# Patient Record
Sex: MALE | Race: WHITE | HISPANIC OR LATINO | URBAN - METROPOLITAN AREA
[De-identification: names, ages, dates, MRNs, and addresses within clinical notes are randomized per-mention and may not be internally consistent; named-entity substitution may affect disease eponyms.]

---

## 2020-12-03 ENCOUNTER — EMERGENCY (EMERGENCY)
Facility: HOSPITAL | Age: 65
LOS: 0 days | Discharge: HOME | End: 2020-12-03
Attending: EMERGENCY MEDICINE
Payer: OTHER MISCELLANEOUS

## 2020-12-03 VITALS
RESPIRATION RATE: 18 BRPM | WEIGHT: 225.09 LBS | TEMPERATURE: 98 F | DIASTOLIC BLOOD PRESSURE: 64 MMHG | OXYGEN SATURATION: 100 % | HEIGHT: 69 IN | HEART RATE: 62 BPM | SYSTOLIC BLOOD PRESSURE: 114 MMHG
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
## 2020-12-03 DIAGNOSIS — S82.831A OTHER FRACTURE OF UPPER AND LOWER END OF RIGHT FIBULA, INITIAL ENCOUNTER FOR CLOSED FRACTURE: ICD-10-CM

## 2020-12-03 DIAGNOSIS — M25.561 PAIN IN RIGHT KNEE: ICD-10-CM
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
## 2020-12-03 DIAGNOSIS — E11.9 TYPE 2 DIABETES MELLITUS WITHOUT COMPLICATIONS: ICD-10-CM

## 2020-12-03 DIAGNOSIS — S76.191A OTHER SPECIFIED INJURY OF RIGHT QUADRICEPS MUSCLE, FASCIA AND TENDON, INITIAL ENCOUNTER: ICD-10-CM
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
## 2020-12-03 PROCEDURE — 73562 X-RAY EXAM OF KNEE 3: CPT | Mod: RT

## 2020-12-03 PROCEDURE — 73600 X-RAY EXAM OF ANKLE: CPT | Mod: RT

## 2020-12-03 PROCEDURE — 73590 X-RAY EXAM OF LOWER LEG: CPT | Mod: RT

## 2020-12-03 PROCEDURE — 73590 X-RAY EXAM OF LOWER LEG: CPT | Mod: 26,RT

## 2020-12-03 PROCEDURE — 73562 X-RAY EXAM OF KNEE 3: CPT | Mod: 26,RT

## 2020-12-03 PROCEDURE — 73600 X-RAY EXAM OF ANKLE: CPT | Mod: 26,RT

## 2020-12-03 PROCEDURE — 99284 EMERGENCY DEPT VISIT MOD MDM: CPT | Mod: 25

## 2020-12-03 PROCEDURE — 99283 EMERGENCY DEPT VISIT LOW MDM: CPT

## 2020-12-03 NOTE — ED ADULT TRIAGE NOTE - CHIEF COMPLAINT QUOTE
c/o fall at work, pt states he felt "pop" in right knee while carrying tools back to truck, c/o right knee swelling and pain

## 2020-12-03 NOTE — ED STATDOCS - NSFOLLOWUPINSTRUCTIONS_ED_ALL_ED_FT
Contusion in Adults    WHAT YOU NEED TO KNOW:    A contusion is a bruise that appears on your skin after an injury. A bruise happens when small blood vessels tear but skin does not. When blood vessels tear, blood leaks into nearby tissue, such as soft tissue or muscle.    DISCHARGE INSTRUCTIONS:    Return to the emergency department if:     You have new trouble moving the injured area.      You have tingling or numbness in or near the injured area.      Your hand or foot below the bruise gets cold or turns pale.     Contact your healthcare provider if:     You find a new lump in the injured area.      Your symptoms do not improve with treatment after 4 to 5 days.      You have questions or concerns about your condition or care.    Medicines: You may need any of the following:     NSAIDs help decrease swelling and pain or fever. This medicine is available with or without a doctor's order. NSAIDs can cause stomach bleeding or kidney problems in certain people. If you take blood thinner medicine, always ask your healthcare provider if NSAIDs are safe for you. Always read the medicine label and follow directions.      Prescription pain medicine may be given. Do not wait until the pain is severe before you take your medicine.      Take your medicine as directed. Contact your healthcare provider if you think your medicine is not helping or if you have side effects. Tell him of her if you are allergic to any medicine. Keep a list of the medicines, vitamins, and herbs you take. Include the amounts, and when and why you take them. Bring the list or the pill bottles to follow-up visits. Carry your medicine list with you in case of an emergency.    Follow up with your healthcare provider as directed: You may need to return within a week to check your injury again. Write down your questions so you remember to ask them during your visits.    Help a contusion heal:     Rest the injured area or use it less than usual. If you bruised your leg or foot, you may need crutches or a cane to help you walk. This will help you keep weight off your injured body part.       Apply ice to decrease swelling and pain. Ice may also help prevent tissue damage. Use an ice pack, or put crushed ice in a plastic bag. Cover it with a towel and place it on your bruise for 15 to 20 minutes every hour or as directed.      Use compression to support the area and decrease swelling. Wrap an elastic bandage around the area over the bruised muscle. Make sure the bandage is not too tight. You should be able to fit 1 finger between the bandage and your skin.      Elevate (raise) your injured body part above the level of your heart to help decrease pain and swelling. Use pillows, blankets, or rolled towels to elevate the area as often as you can.      Do not drink alcohol as directed. Alcohol may slow healing.      Do not stretch injured muscles right after your injury. Ask your healthcare provider when and how you may safely stretch after your injury. Gentle stretches can help increase your flexibility.      Do not massage the area or put heating pads on the bruise right after your injury. Heat and massage may slow healing. Your healthcare provider may tell you to apply heat after several days. At that time, heat will start to help the injury heal.    Prevent another contusion:     Stretch and warm up before you play sports or exercise.      Wear protective gear when you play sports. Examples are shin guards and padding.       If you begin a new physical activity, start slowly to give your body a chance to adjust. Follow up with the orthopedist tomorrow or friday for an elective surgery.     Weight bearing as tolerated.  Do not remove the immobilizer and walk with crutches.     Return to the ER for any new or other concerns.         Quadriceps Tendon Tear       A quadriceps tendon tear or disruption is a partial or complete tear of the tendon between the quadriceps muscles and the kneecap (patella). Tendons connect muscles to bone. The quadriceps muscles are located on the front of the thigh and are primarily used in straightening the knee.    With a partial tear, the tendon is overstretched and some of the fibers are frayed. With a complete tear, the quadriceps muscle is detached from the kneecap. This is very rare.      What are the causes?  This condition can be caused by injury such as:  •A deep cut on your thigh that injures the tendon.      •Falling on your knee, which may result in breaking your patella.      The condition can also occur if you jump and land flat on your foot with your knee bent, causing a quick and forceful tightening (contraction) of your quadriceps.      What increases the risk?  The following factors may make you more likely to develop this condition:•Participating in:  •Activities that involve jumping, such as basketball.      •Activities in which your knee muscles contract suddenly and forcefully, such as doing jumps or moguls in downhill skiing.      •Having a weakened tendon from:  •Long-term (chronic) quadriceps tendinitis.      •Long periods of not moving your knee (immobilization).      •Repeated steroid injections into the quadriceps tendon.      •Medical conditions such as diabetes, lupus, or rheumatoid arthritis.      •Degeneration over time. Most quadriceps tendon tears occur in males over 40 years of age.          What are the signs or symptoms?  Symptoms of this condition include:  •Hearing a popping sound or feeling a tear above your patella at the time of injury.      •Pain and tenderness over your thigh. The pain may get worse when you use the quadriceps muscles.      •Bruising.      •Difficulty walking, or a feeling that your knee may buckle.      •A sagging kneecap or an indentation above your kneecap.      •Not being able to straighten your knee.        How is this diagnosed?  This condition may be diagnosed based on:  •Your symptoms and medical history.    •A physical exam. During the exam, your health care provider will:  •Feel the area above your kneecap.      •Test the motion and strength of your knee.      •Imaging tests to rule out other conditions and to confirm the diagnosis. These may include:  •X-rays to check for a bone injury, such as a fracture.      •An ultrasound or an MRI to look at the muscles and tendons around your knee.          How is this treated?  This condition may be treated with:  •Medicines to help reduce pain and inflammation.      •RICE therapy. This includes resting, icing, applying pressure (compression), and raising (elevating) the injured area.      •A knee brace (immobilizer) to keep the knee straight while the tendon heals. Typically, the brace will be worn for about 6 weeks.      •Crutches to keep weight off of your injured leg.      •Physical therapy to improve strength and flexibility.      If the injury involves a complete tear of the tendon, surgery is usually needed.      Follow these instructions at home:      RICE therapy      •Rest the injured leg.    •If directed, put ice on the injured area.  •If you have a removable knee brace, remove it as told by your health care provider.      •Put ice in a plastic bag.      •Place a towel between your skin and the bag.      •Leave the ice on for 20 minutes, 2–3 times a day.        •Apply a compression bandage to the area as told by your health care provider.      •Elevate the injured area above the level of your heart while you are sitting or lying down.      If you have a knee brace:     •Wear the brace as told by your health care provider. Remove it only as told by your health care provider.      •Loosen the brace if your toes tingle, become numb, or turn cold and blue.      •Keep the brace clean.    •If the brace is not waterproof:  •Do not let it get wet.      •Cover it with a watertight covering when you take a bath or shower.        •Ask your health care provider when it is safe to drive if you have a knee brace.      Activity     • Do not use the injured limb to support your body weight until your health care provider says that you can. Use crutches as told by your health care provider.      •Do exercises as told by your health care provider.      •Return to your normal activities as told by your health care provider. Ask your health care provider what activities are safe for you.      General instructions     •Take over-the-counter and prescription medicines only as told by your health care provider.      • Do not use any products that contain nicotine or tobacco, such as cigarettes, e-cigarettes, and chewing tobacco. These can delay healing. If you need help quitting, ask your health care provider.      •Keep all follow-up visits as told by your health care provider. This is important.        How is this prevented?    •Warm up and stretch before being active.      •Cool down and stretch after being active.      •Give your body time to rest between periods of activity.    •Maintain physical fitness, including:  •Strength.      •Flexibility.        •Be safe and responsible while being active. This will help you avoid falls.        Contact a health care provider if:    •Your pain and swelling continue or get worse, even with treatment and rest.      •You are unable to walk or stand without your knee feeling like it will buckle.        Get help right away if:    •You are unable to straighten your knee from a bent position.        Summary    •A quadriceps tendon tear or disruption is a partial or complete tear of the tendon between the quadriceps muscles and the kneecap.      •This condition is caused by injury to the area.      •This condition is treated with RICE therapy, physical therapy, medicines, and surgery if the tendon is completely torn.      • Do not use the injured limb to support your body weight until your health care provider says that you can. Use crutches as told by your health care provider.      •Return to your normal activities as told by your health care provider. Ask your health care provider what activities are safe for you.      This information is not intended to replace advice given to you by your health care provider. Make sure you discuss any questions you have with your health care provider.

## 2020-12-03 NOTE — ED ADULT NURSE NOTE - NSIMPLEMENTINTERV_GEN_ALL_ED
Implemented All Fall Risk Interventions:  [place] to call system. Call bell, personal items and telephone within reach. Instruct patient to call for assistance. Room bathroom lighting operational. Non-slip footwear when patient is off stretcher. Physically safe environment: no spills, clutter or unnecessary equipment. Stretcher in lowest position, wheels locked, appropriate side rails in place. Provide visual cue, wrist band, yellow gown, etc. Monitor gait and stability. Monitor for mental status changes and reorient to person, place, and time. Review medications for side effects contributing to fall risk. Reinforce activity limits and safety measures with patient and family.

## 2020-12-03 NOTE — ED STATDOCS - OBJECTIVE STATEMENT
64 y/o M with PMHx of DM presents ambulatory to the ED s/p fall at work. Reports he was walking and suddenly felt a "pop" in R knee causing him to fall to the ground. Now with +R knee pain and +edema. No fever. NKDA.

## 2020-12-03 NOTE — ED STATDOCS - CARE PROVIDER_API CALL
Kushal Guillen (DO)  Orthopaedic Surgery  [address] [place] [address]  Phone: [phone]  Fax: [phone]  Follow Up Time:

## 2020-12-03 NOTE — ED STATDOCS - PATIENT PORTAL LINK FT
You can access the FollowMyHealth Patient Portal offered by [facility] by registering at the following website: [email or web address]. By joining [employer]’s FollowMyHealth portal, you will also be able to view your health information using other applications (apps) compatible with our system. You can access the FollowMyHealth Patient Portal offered by [facility] by registering at the following website: [email or web address]. By joining [employer]’s FollowMyHealth portal, you will also be able to view your health information using other applications (apps) compatible with our system.

## 2020-12-03 NOTE — ED ADULT NURSE NOTE - OBJECTIVE STATEMENT
Patient presents ambulatory to the ED s/p fall at work. Reports he was walking and suddenly felt a "pop" in R knee causing him to fall to the ground. Now with +R knee pain and +edema.

## 2020-12-03 NOTE — ED STATDOCS - PROGRESS NOTE DETAILS
64 yo male with a PMH of NIDDM presents with R knee pain s/p fall. Pt was walking at work when he felt a sharp pain to the R knee which caused his knee to buckle and fall. Pain located above the patella and worse with bending. Better with keeping the leg extended. XR and reeval. Pt was offered pain meds but refused. -Brad Jimenez PA-C XR unremarkable. Will send ortho referral for the pt and placed in knee immobilizer. XR unremarkable. Will send ortho referral for the pt and placed in knee immobilizer. -Brad Jimenez PA-C Pt with fibular fx. Will need splinted, orthopedics. Justus Dumont D.O. XR unremarkable. Will send ortho referral for the pt and placed in knee immobilizer. -Brad Jimenez PA-C    8116: XR with confirmed fibular head fracture. Spoke with ortho resident willcome to see pt. Additional xrs ordered. -Brad Jimenez PA-C Spoke with ortho. States pt with likely quadriceps rupture. Placed in bulky cervantes and knee immobilizer and to f/u with Dr. Guillen tomorrow or friday for eleactive surgery. -Brad Jimenez PA-C

## 2020-12-03 NOTE — PROGRESS NOTE ADULT - ASSESSMENT
Assessment/Plan:  65y Male with Right quadriceps tendon avulsion and stable right proximal fibular fibula fracture     Proximal fibula fracture  -No medial clear space widening on stress view of ankle indicates stable proximal fibular fracture  -Patient may WBAT   -Pain control as needed    Quadriceps tendon rupture  -WBAT in Bulky cervantes knee immobilizer with crutches  -Pain control as needed  -Follow up with Dr. Guillen tomorrow or monday  -Will require surgical planning for quad tendon repair   -Discussed with Dr. Guillen  -No acute orthopedic surgical intervention needed at this time  -Ortho stable for discharge

## 2020-12-03 NOTE — PROGRESS NOTE ADULT - SUBJECTIVE AND OBJECTIVE BOX
65y Male presents s/p feeling a pop in his right quad / falling backward. Patient reports he was walking at work this afternoon when he felt a pop in his right quad leading him to fall. He reports pain proximal to superior pole of patella, pain over fibular head and at ankle. He denies numbness/tingling in the affected extremity. Denies head strike/LOC/other orthopedic injuries at this time. Patient ambulates without assistance at baseline.    PAST MEDICAL & SURGICAL HISTORY:    Home Medications:    Allergies    No Known Allergies    Intolerances      Vital Signs Last 24 Hrs  T(C): 36.8 (03 Dec 2020 15:56), Max: 36.8 (03 Dec 2020 15:56)  T(F): 98.3 (03 Dec 2020 15:56), Max: 98.3 (03 Dec 2020 15:56)  HR: 62 (03 Dec 2020 15:56) (62 - 62)  BP: 114/64 (03 Dec 2020 15:56) (114/64 - 114/64)  BP(mean): --  RR: 18 (03 Dec 2020 15:56) (18 - 18)  SpO2: 100% (03 Dec 2020 15:56) (100% - 100%)    PHYSICAL EXAM  General: NAD, Awake and Alert  RLE:  No gross deformity noted, however significant palpable defect felt over the quad tendon insertion  TTP over proximal pole of the patella  TTP over fibular head proximally  TTP over ankle medially  TTP with compression of tibia/fibula  Unable to straight leg raise  +sensation L2-S1  +dorsiflexion/plantarflexion of ankle/hallux  +dorsalis pedis pulse  Soft compartments, - calf tenderness      Secondary Exam: Benign, Skin intact, NTTP along axial spine, SILT throughout, motor grossly intact throughout, no other orthopedic injuries at this time, compartments soft and compressible        IMAGING:  XR : Right knee depicts patella baja and proximal fibula fracture - oblique orientation, non displaced around the fibular neck.    XR R Ankle depicts arthritis but no Fx/Dx. On stress view, medial clear space does not increase

## 2020-12-03 NOTE — ED STATDOCS - CLINICAL SUMMARY MEDICAL DECISION MAKING FREE TEXT BOX
64 yo male presents with R knee injury. Xr unremarkable. WIll place 9in immobilizer and give ortho f/u. -Brad Jimenez PA-C Pt with fibular fx, quad tendon tear.  Evaluated by orthopedics, immobilized, f/u with orthopedics as outpatient. Justus Dumont D.O.

## 2020-12-15 PROBLEM — E11.9 TYPE 2 DIABETES MELLITUS WITHOUT COMPLICATIONS: Chronic | Status: ACTIVE | Noted: 2020-12-03

## 2020-12-16 ENCOUNTER — OUTPATIENT (OUTPATIENT)
Dept: OUTPATIENT SERVICES | Facility: HOSPITAL | Age: 65
LOS: 1 days | End: 2020-12-16
Payer: OTHER MISCELLANEOUS

## 2020-12-16 VITALS
HEART RATE: 61 BPM | WEIGHT: 225.09 LBS | HEIGHT: 69 IN | SYSTOLIC BLOOD PRESSURE: 123 MMHG | OXYGEN SATURATION: 99 % | DIASTOLIC BLOOD PRESSURE: 57 MMHG | TEMPERATURE: 98 F | RESPIRATION RATE: 18 BRPM

## 2020-12-16 DIAGNOSIS — Z90.49 ACQUIRED ABSENCE OF OTHER SPECIFIED PARTS OF DIGESTIVE TRACT: Chronic | ICD-10-CM

## 2020-12-16 DIAGNOSIS — Z01.818 ENCOUNTER FOR OTHER PREPROCEDURAL EXAMINATION: ICD-10-CM

## 2020-12-16 DIAGNOSIS — S76.111D STRAIN OF RIGHT QUADRICEPS MUSCLE, FASCIA AND TENDON, SUBSEQUENT ENCOUNTER: ICD-10-CM

## 2020-12-16 DIAGNOSIS — Z98.890 OTHER SPECIFIED POSTPROCEDURAL STATES: Chronic | ICD-10-CM

## 2020-12-16 LAB
A1C WITH ESTIMATED AVERAGE GLUCOSE RESULT: 8 % — HIGH (ref 4–5.6)
ANION GAP SERPL CALC-SCNC: 4 MMOL/L — LOW (ref 5–17)
APPEARANCE UR: CLEAR — SIGNIFICANT CHANGE UP
APTT BLD: 30.3 SEC — SIGNIFICANT CHANGE UP (ref 27.5–35.5)
BASOPHILS # BLD AUTO: 0.12 K/UL — SIGNIFICANT CHANGE UP (ref 0–0.2)
BASOPHILS NFR BLD AUTO: 1.5 % — SIGNIFICANT CHANGE UP (ref 0–2)
BILIRUB UR-MCNC: NEGATIVE — SIGNIFICANT CHANGE UP
BUN SERPL-MCNC: 20 MG/DL — SIGNIFICANT CHANGE UP (ref 7–23)
CALCIUM SERPL-MCNC: 9.4 MG/DL — SIGNIFICANT CHANGE UP (ref 8.5–10.1)
CHLORIDE SERPL-SCNC: 105 MMOL/L — SIGNIFICANT CHANGE UP (ref 96–108)
CO2 SERPL-SCNC: 30 MMOL/L — SIGNIFICANT CHANGE UP (ref 22–31)
COLOR SPEC: YELLOW — SIGNIFICANT CHANGE UP
CREAT SERPL-MCNC: 1.17 MG/DL — SIGNIFICANT CHANGE UP (ref 0.5–1.3)
DIFF PNL FLD: NEGATIVE — SIGNIFICANT CHANGE UP
EOSINOPHIL # BLD AUTO: 0.46 K/UL — SIGNIFICANT CHANGE UP (ref 0–0.5)
EOSINOPHIL NFR BLD AUTO: 5.6 % — SIGNIFICANT CHANGE UP (ref 0–6)
ESTIMATED AVERAGE GLUCOSE: 183 MG/DL — HIGH (ref 68–114)
GLUCOSE SERPL-MCNC: 81 MG/DL — SIGNIFICANT CHANGE UP (ref 70–99)
GLUCOSE UR QL: NEGATIVE MG/DL — SIGNIFICANT CHANGE UP
HCT VFR BLD CALC: 46.4 % — SIGNIFICANT CHANGE UP (ref 39–50)
HGB BLD-MCNC: 14.9 G/DL — SIGNIFICANT CHANGE UP (ref 13–17)
IMM GRANULOCYTES NFR BLD AUTO: 0.4 % — SIGNIFICANT CHANGE UP (ref 0–1.5)
INR BLD: 1.13 RATIO — SIGNIFICANT CHANGE UP (ref 0.88–1.16)
KETONES UR-MCNC: ABNORMAL
LEUKOCYTE ESTERASE UR-ACNC: ABNORMAL
LYMPHOCYTES # BLD AUTO: 1.97 K/UL — SIGNIFICANT CHANGE UP (ref 1–3.3)
LYMPHOCYTES # BLD AUTO: 24 % — SIGNIFICANT CHANGE UP (ref 13–44)
MCHC RBC-ENTMCNC: 32.1 GM/DL — SIGNIFICANT CHANGE UP (ref 32–36)
MCHC RBC-ENTMCNC: 32.1 PG — SIGNIFICANT CHANGE UP (ref 27–34)
MCV RBC AUTO: 100 FL — SIGNIFICANT CHANGE UP (ref 80–100)
MONOCYTES # BLD AUTO: 0.57 K/UL — SIGNIFICANT CHANGE UP (ref 0–0.9)
MONOCYTES NFR BLD AUTO: 6.9 % — SIGNIFICANT CHANGE UP (ref 2–14)
NEUTROPHILS # BLD AUTO: 5.06 K/UL — SIGNIFICANT CHANGE UP (ref 1.8–7.4)
NEUTROPHILS NFR BLD AUTO: 61.6 % — SIGNIFICANT CHANGE UP (ref 43–77)
NITRITE UR-MCNC: NEGATIVE — SIGNIFICANT CHANGE UP
PH UR: 5 — SIGNIFICANT CHANGE UP (ref 5–8)
PLATELET # BLD AUTO: 389 K/UL — SIGNIFICANT CHANGE UP (ref 150–400)
POTASSIUM SERPL-MCNC: 4.6 MMOL/L — SIGNIFICANT CHANGE UP (ref 3.5–5.3)
POTASSIUM SERPL-SCNC: 4.6 MMOL/L — SIGNIFICANT CHANGE UP (ref 3.5–5.3)
PROT UR-MCNC: 15 MG/DL
PROTHROM AB SERPL-ACNC: 13.1 SEC — SIGNIFICANT CHANGE UP (ref 10.6–13.6)
RBC # BLD: 4.64 M/UL — SIGNIFICANT CHANGE UP (ref 4.2–5.8)
RBC # FLD: 13.4 % — SIGNIFICANT CHANGE UP (ref 10.3–14.5)
SODIUM SERPL-SCNC: 139 MMOL/L — SIGNIFICANT CHANGE UP (ref 135–145)
SP GR SPEC: 1.02 — SIGNIFICANT CHANGE UP (ref 1.01–1.02)
UROBILINOGEN FLD QL: NEGATIVE MG/DL — SIGNIFICANT CHANGE UP
WBC # BLD: 8.21 K/UL — SIGNIFICANT CHANGE UP (ref 3.8–10.5)
WBC # FLD AUTO: 8.21 K/UL — SIGNIFICANT CHANGE UP (ref 3.8–10.5)

## 2020-12-16 PROCEDURE — 86850 RBC ANTIBODY SCREEN: CPT

## 2020-12-16 PROCEDURE — 86901 BLOOD TYPING SEROLOGIC RH(D): CPT

## 2020-12-16 PROCEDURE — 85730 THROMBOPLASTIN TIME PARTIAL: CPT

## 2020-12-16 PROCEDURE — 81001 URINALYSIS AUTO W/SCOPE: CPT

## 2020-12-16 PROCEDURE — 83036 HEMOGLOBIN GLYCOSYLATED A1C: CPT

## 2020-12-16 PROCEDURE — 93005 ELECTROCARDIOGRAM TRACING: CPT

## 2020-12-16 PROCEDURE — 36415 COLL VENOUS BLD VENIPUNCTURE: CPT

## 2020-12-16 PROCEDURE — 93010 ELECTROCARDIOGRAM REPORT: CPT

## 2020-12-16 PROCEDURE — 85025 COMPLETE CBC W/AUTO DIFF WBC: CPT

## 2020-12-16 PROCEDURE — 86900 BLOOD TYPING SEROLOGIC ABO: CPT

## 2020-12-16 PROCEDURE — G0463: CPT | Mod: 25

## 2020-12-16 PROCEDURE — 85610 PROTHROMBIN TIME: CPT

## 2020-12-16 PROCEDURE — 80048 BASIC METABOLIC PNL TOTAL CA: CPT

## 2020-12-16 NOTE — H&P PST ADULT - NSANTHOSAYNRD_GEN_A_CORE
No. STEW screening performed.  STOP BANG Legend: 0-2 = LOW Risk; 3-4 = INTERMEDIATE Risk; 5-8 = HIGH Risk

## 2020-12-16 NOTE — H&P PST ADULT - HISTORY OF PRESENT ILLNESS
66 y/o male with right quad tendon rupture. Pt reports he was at work, while walking he heard a pop from his right leg then he fell, he went to ER at , a splint was applied in ER. He is here for PST for planned Right quad tendon repair.

## 2020-12-16 NOTE — H&P PST ADULT - SKIN

## 2020-12-16 NOTE — H&P PST ADULT - ASSESSMENT
66 y/o male with right quad tendon rupture. Pt is scheduled for Right quad tendon repair.   Plan  1. Stop all NSAIDS, herbal supplements and vitamins for 7 days.  2. NPO as per ASU instructions.  3. Take the following medications ( Amlodipine ) with small sips of water on the morning of your procedure/surgery.  4. Use EZ sponges as directed  5. Labs, EKG as per surgeon. COVID test on 12/18/2020, pt instructed.   6. PMD visit for optimization prior to surgery as per surgeon  7. Advised to quarantine prior to surgery  8. Hold Metformin 24 hrs before surgery, pt to follow preop insulin instructions from PMD.

## 2020-12-16 NOTE — H&P PST ADULT - NSICDXPASTMEDICALHX_GEN_ALL_CORE_FT
PAST MEDICAL HISTORY:  DM (diabetes mellitus)     HTN (hypertension)     Hypercholesterolemia     Lumbar spinal stenosis     OA (osteoarthritis)     Rupture quadriceps tendon right

## 2020-12-16 NOTE — H&P PST ADULT - NSICDXPASTSURGICALHX_GEN_ALL_CORE_FT
PAST SURGICAL HISTORY:  S/P Achilles tendon repair     S/P cholecystectomy open    S/P laminectomy lumbar

## 2020-12-16 NOTE — H&P PST ADULT - NSICDXFAMILYHX_GEN_ALL_CORE_FT
FAMILY HISTORY:  Family history of Alzheimer's disease, mother  FH: CHF (congestive heart failure), mother

## 2020-12-17 DIAGNOSIS — S76.111D STRAIN OF RIGHT QUADRICEPS MUSCLE, FASCIA AND TENDON, SUBSEQUENT ENCOUNTER: ICD-10-CM

## 2020-12-17 DIAGNOSIS — Z01.818 ENCOUNTER FOR OTHER PREPROCEDURAL EXAMINATION: ICD-10-CM

## 2020-12-18 ENCOUNTER — OUTPATIENT (OUTPATIENT)
Dept: OUTPATIENT SERVICES | Facility: HOSPITAL | Age: 65
LOS: 1 days | End: 2020-12-18
Payer: OTHER MISCELLANEOUS

## 2020-12-18 DIAGNOSIS — Z98.890 OTHER SPECIFIED POSTPROCEDURAL STATES: Chronic | ICD-10-CM

## 2020-12-18 DIAGNOSIS — Z20.828 CONTACT WITH AND (SUSPECTED) EXPOSURE TO OTHER VIRAL COMMUNICABLE DISEASES: ICD-10-CM

## 2020-12-18 DIAGNOSIS — Z90.49 ACQUIRED ABSENCE OF OTHER SPECIFIED PARTS OF DIGESTIVE TRACT: Chronic | ICD-10-CM

## 2020-12-18 LAB — SARS-COV-2 RNA SPEC QL NAA+PROBE: SIGNIFICANT CHANGE UP

## 2020-12-18 PROCEDURE — U0003: CPT

## 2020-12-19 DIAGNOSIS — Z20.828 CONTACT WITH AND (SUSPECTED) EXPOSURE TO OTHER VIRAL COMMUNICABLE DISEASES: ICD-10-CM

## 2020-12-20 ENCOUNTER — INPATIENT (INPATIENT)
Facility: HOSPITAL | Age: 65
LOS: 1 days | Discharge: HOME HEALTH | DRG: 317 | End: 2020-12-22
Attending: ORTHOPAEDIC SURGERY | Admitting: ORTHOPAEDIC SURGERY
Payer: OTHER MISCELLANEOUS

## 2020-12-20 VITALS — WEIGHT: 229.94 LBS | HEIGHT: 69 IN

## 2020-12-20 DIAGNOSIS — Z98.890 OTHER SPECIFIED POSTPROCEDURAL STATES: Chronic | ICD-10-CM

## 2020-12-20 DIAGNOSIS — S76.119A STRAIN OF UNSPECIFIED QUADRICEPS MUSCLE, FASCIA AND TENDON, INITIAL ENCOUNTER: ICD-10-CM

## 2020-12-20 DIAGNOSIS — Z90.49 ACQUIRED ABSENCE OF OTHER SPECIFIED PARTS OF DIGESTIVE TRACT: Chronic | ICD-10-CM

## 2020-12-20 PROBLEM — I10 ESSENTIAL (PRIMARY) HYPERTENSION: Chronic | Status: ACTIVE | Noted: 2020-12-16

## 2020-12-20 PROBLEM — M48.061 SPINAL STENOSIS, LUMBAR REGION WITHOUT NEUROGENIC CLAUDICATION: Chronic | Status: ACTIVE | Noted: 2020-12-16

## 2020-12-20 PROBLEM — M19.90 UNSPECIFIED OSTEOARTHRITIS, UNSPECIFIED SITE: Chronic | Status: ACTIVE | Noted: 2020-12-16

## 2020-12-20 PROBLEM — E78.00 PURE HYPERCHOLESTEROLEMIA, UNSPECIFIED: Chronic | Status: ACTIVE | Noted: 2020-12-16

## 2020-12-20 LAB
ANION GAP SERPL CALC-SCNC: 4 MMOL/L — LOW (ref 5–17)
APTT BLD: 29.2 SEC — SIGNIFICANT CHANGE UP (ref 27.5–35.5)
BASOPHILS # BLD AUTO: 0.12 K/UL — SIGNIFICANT CHANGE UP (ref 0–0.2)
BASOPHILS NFR BLD AUTO: 1.6 % — SIGNIFICANT CHANGE UP (ref 0–2)
BUN SERPL-MCNC: 20 MG/DL — SIGNIFICANT CHANGE UP (ref 7–23)
CALCIUM SERPL-MCNC: 9.4 MG/DL — SIGNIFICANT CHANGE UP (ref 8.5–10.1)
CHLORIDE SERPL-SCNC: 107 MMOL/L — SIGNIFICANT CHANGE UP (ref 96–108)
CO2 SERPL-SCNC: 28 MMOL/L — SIGNIFICANT CHANGE UP (ref 22–31)
CREAT SERPL-MCNC: 1.38 MG/DL — HIGH (ref 0.5–1.3)
EOSINOPHIL # BLD AUTO: 0.44 K/UL — SIGNIFICANT CHANGE UP (ref 0–0.5)
EOSINOPHIL NFR BLD AUTO: 5.9 % — SIGNIFICANT CHANGE UP (ref 0–6)
GLUCOSE SERPL-MCNC: 176 MG/DL — HIGH (ref 70–99)
HCT VFR BLD CALC: 45 % — SIGNIFICANT CHANGE UP (ref 39–50)
HGB BLD-MCNC: 14.7 G/DL — SIGNIFICANT CHANGE UP (ref 13–17)
IMM GRANULOCYTES NFR BLD AUTO: 0.3 % — SIGNIFICANT CHANGE UP (ref 0–1.5)
INR BLD: 1.12 RATIO — SIGNIFICANT CHANGE UP (ref 0.88–1.16)
LYMPHOCYTES # BLD AUTO: 1.76 K/UL — SIGNIFICANT CHANGE UP (ref 1–3.3)
LYMPHOCYTES # BLD AUTO: 23.5 % — SIGNIFICANT CHANGE UP (ref 13–44)
MCHC RBC-ENTMCNC: 32.6 PG — SIGNIFICANT CHANGE UP (ref 27–34)
MCHC RBC-ENTMCNC: 32.7 GM/DL — SIGNIFICANT CHANGE UP (ref 32–36)
MCV RBC AUTO: 99.8 FL — SIGNIFICANT CHANGE UP (ref 80–100)
MONOCYTES # BLD AUTO: 0.5 K/UL — SIGNIFICANT CHANGE UP (ref 0–0.9)
MONOCYTES NFR BLD AUTO: 6.7 % — SIGNIFICANT CHANGE UP (ref 2–14)
NEUTROPHILS # BLD AUTO: 4.64 K/UL — SIGNIFICANT CHANGE UP (ref 1.8–7.4)
NEUTROPHILS NFR BLD AUTO: 62 % — SIGNIFICANT CHANGE UP (ref 43–77)
PLATELET # BLD AUTO: 389 K/UL — SIGNIFICANT CHANGE UP (ref 150–400)
POTASSIUM SERPL-MCNC: 4.7 MMOL/L — SIGNIFICANT CHANGE UP (ref 3.5–5.3)
POTASSIUM SERPL-SCNC: 4.7 MMOL/L — SIGNIFICANT CHANGE UP (ref 3.5–5.3)
PROTHROM AB SERPL-ACNC: 13 SEC — SIGNIFICANT CHANGE UP (ref 10.6–13.6)
RBC # BLD: 4.51 M/UL — SIGNIFICANT CHANGE UP (ref 4.2–5.8)
RBC # FLD: 13.5 % — SIGNIFICANT CHANGE UP (ref 10.3–14.5)
SARS-COV-2 RNA SPEC QL NAA+PROBE: SIGNIFICANT CHANGE UP
SODIUM SERPL-SCNC: 139 MMOL/L — SIGNIFICANT CHANGE UP (ref 135–145)
WBC # BLD: 7.48 K/UL — SIGNIFICANT CHANGE UP (ref 3.8–10.5)
WBC # FLD AUTO: 7.48 K/UL — SIGNIFICANT CHANGE UP (ref 3.8–10.5)

## 2020-12-20 PROCEDURE — 85730 THROMBOPLASTIN TIME PARTIAL: CPT

## 2020-12-20 PROCEDURE — 99223 1ST HOSP IP/OBS HIGH 75: CPT

## 2020-12-20 PROCEDURE — 82962 GLUCOSE BLOOD TEST: CPT

## 2020-12-20 PROCEDURE — 71045 X-RAY EXAM CHEST 1 VIEW: CPT | Mod: 26

## 2020-12-20 PROCEDURE — 85027 COMPLETE CBC AUTOMATED: CPT

## 2020-12-20 PROCEDURE — 93005 ELECTROCARDIOGRAM TRACING: CPT

## 2020-12-20 PROCEDURE — 71045 X-RAY EXAM CHEST 1 VIEW: CPT

## 2020-12-20 PROCEDURE — 85610 PROTHROMBIN TIME: CPT

## 2020-12-20 PROCEDURE — 97530 THERAPEUTIC ACTIVITIES: CPT | Mod: GP

## 2020-12-20 PROCEDURE — 80048 BASIC METABOLIC PNL TOTAL CA: CPT

## 2020-12-20 PROCEDURE — 97162 PT EVAL MOD COMPLEX 30 MIN: CPT | Mod: GP

## 2020-12-20 PROCEDURE — 97116 GAIT TRAINING THERAPY: CPT | Mod: GP

## 2020-12-20 PROCEDURE — 86803 HEPATITIS C AB TEST: CPT

## 2020-12-20 PROCEDURE — 36415 COLL VENOUS BLD VENIPUNCTURE: CPT

## 2020-12-20 RX ORDER — SODIUM CHLORIDE 9 MG/ML
1000 INJECTION, SOLUTION INTRAVENOUS
Refills: 0 | Status: DISCONTINUED | OUTPATIENT
Start: 2020-12-20 | End: 2020-12-22

## 2020-12-20 RX ORDER — DEXTROSE 50 % IN WATER 50 %
15 SYRINGE (ML) INTRAVENOUS ONCE
Refills: 0 | Status: DISCONTINUED | OUTPATIENT
Start: 2020-12-20 | End: 2020-12-22

## 2020-12-20 RX ORDER — MAGNESIUM HYDROXIDE 400 MG/1
30 TABLET, CHEWABLE ORAL DAILY
Refills: 0 | Status: DISCONTINUED | OUTPATIENT
Start: 2020-12-20 | End: 2020-12-22

## 2020-12-20 RX ORDER — ASCORBIC ACID 60 MG
500 TABLET,CHEWABLE ORAL
Refills: 0 | Status: DISCONTINUED | OUTPATIENT
Start: 2020-12-20 | End: 2020-12-22

## 2020-12-20 RX ORDER — SODIUM CHLORIDE 9 MG/ML
1000 INJECTION INTRAMUSCULAR; INTRAVENOUS; SUBCUTANEOUS
Refills: 0 | Status: DISCONTINUED | OUTPATIENT
Start: 2020-12-20 | End: 2020-12-20

## 2020-12-20 RX ORDER — OXYCODONE HYDROCHLORIDE 5 MG/1
5 TABLET ORAL EVERY 4 HOURS
Refills: 0 | Status: DISCONTINUED | OUTPATIENT
Start: 2020-12-20 | End: 2020-12-21

## 2020-12-20 RX ORDER — OXYCODONE HYDROCHLORIDE 5 MG/1
10 TABLET ORAL EVERY 4 HOURS
Refills: 0 | Status: DISCONTINUED | OUTPATIENT
Start: 2020-12-20 | End: 2020-12-21

## 2020-12-20 RX ORDER — HYDROMORPHONE HYDROCHLORIDE 2 MG/ML
0.5 INJECTION INTRAMUSCULAR; INTRAVENOUS; SUBCUTANEOUS EVERY 6 HOURS
Refills: 0 | Status: DISCONTINUED | OUTPATIENT
Start: 2020-12-20 | End: 2020-12-21

## 2020-12-20 RX ORDER — FOLIC ACID 0.8 MG
1 TABLET ORAL DAILY
Refills: 0 | Status: DISCONTINUED | OUTPATIENT
Start: 2020-12-20 | End: 2020-12-21

## 2020-12-20 RX ORDER — DEXTROSE 50 % IN WATER 50 %
12.5 SYRINGE (ML) INTRAVENOUS ONCE
Refills: 0 | Status: DISCONTINUED | OUTPATIENT
Start: 2020-12-20 | End: 2020-12-22

## 2020-12-20 RX ORDER — HEPARIN SODIUM 5000 [USP'U]/ML
5000 INJECTION INTRAVENOUS; SUBCUTANEOUS EVERY 8 HOURS
Refills: 0 | Status: COMPLETED | OUTPATIENT
Start: 2020-12-20 | End: 2020-12-20

## 2020-12-20 RX ORDER — AMLODIPINE BESYLATE 2.5 MG/1
5 TABLET ORAL DAILY
Refills: 0 | Status: DISCONTINUED | OUTPATIENT
Start: 2020-12-20 | End: 2020-12-22

## 2020-12-20 RX ORDER — PANTOPRAZOLE SODIUM 20 MG/1
40 TABLET, DELAYED RELEASE ORAL
Refills: 0 | Status: DISCONTINUED | OUTPATIENT
Start: 2020-12-20 | End: 2020-12-21

## 2020-12-20 RX ORDER — DEXTROSE 50 % IN WATER 50 %
25 SYRINGE (ML) INTRAVENOUS ONCE
Refills: 0 | Status: DISCONTINUED | OUTPATIENT
Start: 2020-12-20 | End: 2020-12-22

## 2020-12-20 RX ORDER — ACETAMINOPHEN 500 MG
650 TABLET ORAL EVERY 6 HOURS
Refills: 0 | Status: DISCONTINUED | OUTPATIENT
Start: 2020-12-20 | End: 2020-12-21

## 2020-12-20 RX ORDER — INSULIN LISPRO 100/ML
VIAL (ML) SUBCUTANEOUS
Refills: 0 | Status: DISCONTINUED | OUTPATIENT
Start: 2020-12-21 | End: 2020-12-22

## 2020-12-20 RX ORDER — GLUCAGON INJECTION, SOLUTION 0.5 MG/.1ML
1 INJECTION, SOLUTION SUBCUTANEOUS ONCE
Refills: 0 | Status: DISCONTINUED | OUTPATIENT
Start: 2020-12-20 | End: 2020-12-22

## 2020-12-20 RX ORDER — SODIUM CHLORIDE 9 MG/ML
500 INJECTION INTRAMUSCULAR; INTRAVENOUS; SUBCUTANEOUS ONCE
Refills: 0 | Status: COMPLETED | OUTPATIENT
Start: 2020-12-20 | End: 2020-12-20

## 2020-12-20 RX ADMIN — SODIUM CHLORIDE 75 MILLILITER(S): 9 INJECTION, SOLUTION INTRAVENOUS at 23:28

## 2020-12-20 RX ADMIN — SODIUM CHLORIDE 500 MILLILITER(S): 9 INJECTION INTRAMUSCULAR; INTRAVENOUS; SUBCUTANEOUS at 13:57

## 2020-12-20 RX ADMIN — HEPARIN SODIUM 5000 UNIT(S): 5000 INJECTION INTRAVENOUS; SUBCUTANEOUS at 21:25

## 2020-12-20 NOTE — H&P ADULT - NSHPPHYSICALEXAM_GEN_ALL_CORE
General: NAD, Aox3  RLE:  - skin intact, palpable defect of the quadriceps tendon  - bony tenderness around the proximal fibula, no bony TTP over patella/knee joint  - patient unable to SLR, no extensor mechanism present   - + EHL/TA/FHL/GSC  - SILT  - + pulses   - compartments soft and compressible    Secondary Assessment:  NC/AT, NTTP of clavicles, NTTP of C-,T-,L-Spine, NTTP of Pelvis  UEs: NTTP of Shoulders, Elbows, Wrists, Hands; NT with AROM/PROM of Shoulders, Elbows, Wrists, Hands; AIN/PIN/Med/Uln/Msc/Rad/Ax intact  LEs: Able to L SLR, NT with Log Roll, NT with Heel Strike, NTTP of Hips, Knees, Ankles, Feet; NT with AROM/PROM of Hips, L Knees, Ankles, Feet; Q/H/Gsc/TA/EHL/FHL intact

## 2020-12-20 NOTE — H&P ADULT - HISTORY OF PRESENT ILLNESS
Patient is a 65 year old male with a history of IDDM, HTN who suffered a mechanical fall on 12/3 and suffered a quadriceps tendon rupture. Patient states he was at work when he stepped awkwardly, heard an audible pop, and fell to the ground now with right knee pain and difficulty ambulating. Patient seen in [place] ED following injury and was placed in knee immobilizer, now here for planned surgery tm with Dr. Miller for primary repair. Patient denies head strike/LOC. Patient denies numbness/tingling. Patient denies additional orthopedic injuries. Denies use of blood thinners. Patient is a normal community ambulator at baseline and works as a generator repairman.

## 2020-12-20 NOTE — ED ADULT NURSE NOTE - OBJECTIVE STATEMENT
Pt states he was sent by Dr. Ayala for "medical clearance" prior to right knee surgery (right quadricep) on 12/21/2020.  Pt states that he has completed preoperative testing on 12/18/2020.

## 2020-12-20 NOTE — ED STATDOCS - PROGRESS NOTE DETAILS
65 yr. old male PMH: Type 2 Diabetes, HTN presents to ED with request for medical clearance sent by Dr. Miller for admission for repair of right quadricep tendon Contacted Dr. Miller reports patient sent to ED for admission and medical clearance. Geovani NP 65 yr. old male PMH: Type 2 Diabetes, HTN presents to ED with request for medical clearance sent by Dr. Miller for admission for repair of right quadricep tendon tear. No fever or chills. Seen and examined by attending in Intake. Plan: IV, Labs, pre op orders and admit. Will F/U with results and re evaluate. Geovani NP

## 2020-12-20 NOTE — ED STATDOCS - NS_ ATTENDINGSCRIBEDETAILS _ED_A_ED_FT
I, Edwin Tapia MD,  performed the initial face to face bedside interview with this patient regarding history of present illness, review of symptoms and relevant past medical, social and family history.  I completed an independent physical examination.    The history, relevant review of systems, past medical and surgical history, medical decision making, and physical examination was documented by the scribe in my presence and I attest to the accuracy of the documentation.

## 2020-12-20 NOTE — ED STATDOCS - PMH
DM (diabetes mellitus)    HTN (hypertension)    Hypercholesterolemia    Lumbar spinal stenosis    OA (osteoarthritis)    Rupture quadriceps tendon  right

## 2020-12-20 NOTE — ED ADULT TRIAGE NOTE - CHIEF COMPLAINT QUOTE
Pt states he was sent by Dr. Ayala for "medical clearance" prior to right knee surgery on 12/21/2020.  Pt states that he has completed preoperative testing on 12/18/2020.

## 2020-12-20 NOTE — H&P ADULT - ASSESSMENT
A/P: 65 year old male with right quadriceps tendon rupture  - Plan for primary surgical repair tomorrow 12/21   - NPO after midnight/IVF  - Heparin dosed for this evening, hold all DVT chemoprophylaxis after midnight tonight  - Admission labs noted  - FU preop labs tomorrow morning  - WBAT RLE with knee immobilizer  - Medical care appreciated  - Hold metformin/novolin, hold HCTZ/losartan per medicine   - Glucose control with ISS  - Medically optimized for procedure tomorrow   - Discussed with attending who agrees with plan, will update as needed.

## 2020-12-20 NOTE — ED ADULT NURSE NOTE - NSIMPLEMENTINTERV_GEN_ALL_ED
Implemented All Fall Risk Interventions:  [place] to call system. Call bell, personal items and telephone within reach. Instruct patient to call for assistance. Room bathroom lighting operational. Non-slip footwear when patient is off stretcher. Physically safe environment: no spills, clutter or unnecessary equipment. Stretcher in lowest position, wheels locked, appropriate side rails in place. Provide visual cue, wrist band, yellow gown, etc. Monitor gait and stability. Monitor for mental status changes and reorient to person, place, and time. Review medications for side effects contributing to fall risk. Reinforce activity limits and safety measures with patient and family.

## 2020-12-20 NOTE — CONSULT NOTE ADULT - ASSESSMENT
right quadriceps rupture. plan for repair in OR tomorrow  patient is moderate risk candidate undergoing moderate risk procedure.    no absolute contraindication therefore medically cleared for procedure tomorrow    Stuart score- 0.3 %-Risk of myocardial infarction or cardiac arrest, intraoperatively or up to 30 days post-op  -care as per ortho     HTN  -cont amlodipine,   -give valsartan/HCTZ now, hold tomorrow     IDDM, last a1c-8.0. on novolin 70/30 45 units BID at home  -hold novolin and metformin, will give lantus 10 units tonight,   -npo after mn  -sliding scale and BGM     dvt prophylaixs   -as per surgery                 right quadriceps rupture. plan for repair in OR tomorrow  patient is moderate risk candidate undergoing moderate risk procedure.    no absolute contraindication therefore medically cleared for procedure tomorrow    Stuart score- 0.3 %-Risk of myocardial infarction or cardiac arrest, intraoperatively or up to 30 days post-op  -care as per ortho     HTN  -cont amlodipine,   -hold valsartan/HCTZ in light of mild elevated creatinine normla on 12/16, will give ns bolus     IDDM, last a1c-8.0. on novolin 70/30 45 units BID at home  -hold novolin and metformin, will give lantus 10 units tonight,   -npo after mn  -sliding scale and BGM     dvt prophylaixs   -as per surgery

## 2020-12-20 NOTE — ED STATDOCS - OBJECTIVE STATEMENT
66 y/o M with PMHx of DM, HTN, HLD, OA, lumbar spinal stenosis s/p laminectomy, and s/p cholecystectomy presents to the ED by orthopedist Dr. Russ Miller for admission and medical clearance for surgery tomorrow 2/2 R quadriceps tendon avulsion. No fever or other complaints. NKDA. PCP: Dr. Ksenia Dong.

## 2020-12-21 ENCOUNTER — TRANSCRIPTION ENCOUNTER (OUTPATIENT)
Age: 65
End: 2020-12-21

## 2020-12-21 LAB
ANION GAP SERPL CALC-SCNC: 5 MMOL/L — SIGNIFICANT CHANGE UP (ref 5–17)
ANION GAP SERPL CALC-SCNC: 6 MMOL/L — SIGNIFICANT CHANGE UP (ref 5–17)
APTT BLD: 27.7 SEC — SIGNIFICANT CHANGE UP (ref 27.5–35.5)
BUN SERPL-MCNC: 18 MG/DL — SIGNIFICANT CHANGE UP (ref 7–23)
BUN SERPL-MCNC: 23 MG/DL — SIGNIFICANT CHANGE UP (ref 7–23)
CALCIUM SERPL-MCNC: 8.7 MG/DL — SIGNIFICANT CHANGE UP (ref 8.5–10.1)
CALCIUM SERPL-MCNC: 8.9 MG/DL — SIGNIFICANT CHANGE UP (ref 8.5–10.1)
CHLORIDE SERPL-SCNC: 107 MMOL/L — SIGNIFICANT CHANGE UP (ref 96–108)
CHLORIDE SERPL-SCNC: 109 MMOL/L — HIGH (ref 96–108)
CO2 SERPL-SCNC: 26 MMOL/L — SIGNIFICANT CHANGE UP (ref 22–31)
CO2 SERPL-SCNC: 27 MMOL/L — SIGNIFICANT CHANGE UP (ref 22–31)
CREAT SERPL-MCNC: 1.14 MG/DL — SIGNIFICANT CHANGE UP (ref 0.5–1.3)
CREAT SERPL-MCNC: 1.2 MG/DL — SIGNIFICANT CHANGE UP (ref 0.5–1.3)
GLUCOSE SERPL-MCNC: 148 MG/DL — HIGH (ref 70–99)
GLUCOSE SERPL-MCNC: 158 MG/DL — HIGH (ref 70–99)
HCT VFR BLD CALC: 39.8 % — SIGNIFICANT CHANGE UP (ref 39–50)
HCT VFR BLD CALC: 41.7 % — SIGNIFICANT CHANGE UP (ref 39–50)
HCV AB S/CO SERPL IA: 0.07 S/CO — SIGNIFICANT CHANGE UP (ref 0–0.99)
HCV AB SERPL-IMP: SIGNIFICANT CHANGE UP
HGB BLD-MCNC: 13.1 G/DL — SIGNIFICANT CHANGE UP (ref 13–17)
HGB BLD-MCNC: 13.5 G/DL — SIGNIFICANT CHANGE UP (ref 13–17)
INR BLD: 1.19 RATIO — HIGH (ref 0.88–1.16)
MCHC RBC-ENTMCNC: 32.4 GM/DL — SIGNIFICANT CHANGE UP (ref 32–36)
MCHC RBC-ENTMCNC: 32.5 PG — SIGNIFICANT CHANGE UP (ref 27–34)
MCHC RBC-ENTMCNC: 32.6 PG — SIGNIFICANT CHANGE UP (ref 27–34)
MCHC RBC-ENTMCNC: 32.9 GM/DL — SIGNIFICANT CHANGE UP (ref 32–36)
MCV RBC AUTO: 100.2 FL — HIGH (ref 80–100)
MCV RBC AUTO: 99 FL — SIGNIFICANT CHANGE UP (ref 80–100)
PLATELET # BLD AUTO: 326 K/UL — SIGNIFICANT CHANGE UP (ref 150–400)
PLATELET # BLD AUTO: 359 K/UL — SIGNIFICANT CHANGE UP (ref 150–400)
POTASSIUM SERPL-MCNC: 4.2 MMOL/L — SIGNIFICANT CHANGE UP (ref 3.5–5.3)
POTASSIUM SERPL-MCNC: 4.4 MMOL/L — SIGNIFICANT CHANGE UP (ref 3.5–5.3)
POTASSIUM SERPL-SCNC: 4.2 MMOL/L — SIGNIFICANT CHANGE UP (ref 3.5–5.3)
POTASSIUM SERPL-SCNC: 4.4 MMOL/L — SIGNIFICANT CHANGE UP (ref 3.5–5.3)
PROTHROM AB SERPL-ACNC: 13.7 SEC — HIGH (ref 10.6–13.6)
RBC # BLD: 4.02 M/UL — LOW (ref 4.2–5.8)
RBC # BLD: 4.16 M/UL — LOW (ref 4.2–5.8)
RBC # FLD: 13.4 % — SIGNIFICANT CHANGE UP (ref 10.3–14.5)
RBC # FLD: 13.6 % — SIGNIFICANT CHANGE UP (ref 10.3–14.5)
SARS-COV-2 IGG SERPL QL IA: NEGATIVE — SIGNIFICANT CHANGE UP
SARS-COV-2 IGM SERPL IA-ACNC: 0.06 INDEX — SIGNIFICANT CHANGE UP
SODIUM SERPL-SCNC: 139 MMOL/L — SIGNIFICANT CHANGE UP (ref 135–145)
SODIUM SERPL-SCNC: 141 MMOL/L — SIGNIFICANT CHANGE UP (ref 135–145)
WBC # BLD: 12.68 K/UL — HIGH (ref 3.8–10.5)
WBC # BLD: 6.46 K/UL — SIGNIFICANT CHANGE UP (ref 3.8–10.5)
WBC # FLD AUTO: 12.68 K/UL — HIGH (ref 3.8–10.5)
WBC # FLD AUTO: 6.46 K/UL — SIGNIFICANT CHANGE UP (ref 3.8–10.5)

## 2020-12-21 PROCEDURE — 93010 ELECTROCARDIOGRAM REPORT: CPT

## 2020-12-21 PROCEDURE — 99232 SBSQ HOSP IP/OBS MODERATE 35: CPT

## 2020-12-21 RX ORDER — FENTANYL CITRATE 50 UG/ML
50 INJECTION INTRAVENOUS
Refills: 0 | Status: DISCONTINUED | OUTPATIENT
Start: 2020-12-21 | End: 2020-12-21

## 2020-12-21 RX ORDER — ONDANSETRON 8 MG/1
4 TABLET, FILM COATED ORAL ONCE
Refills: 0 | Status: DISCONTINUED | OUTPATIENT
Start: 2020-12-21 | End: 2020-12-21

## 2020-12-21 RX ORDER — INSULIN LISPRO 100/ML
VIAL (ML) SUBCUTANEOUS
Refills: 0 | Status: DISCONTINUED | OUTPATIENT
Start: 2020-12-21 | End: 2020-12-22

## 2020-12-21 RX ORDER — BENZOCAINE AND MENTHOL 5; 1 G/100ML; G/100ML
1 LIQUID ORAL
Refills: 0 | Status: DISCONTINUED | OUTPATIENT
Start: 2020-12-21 | End: 2020-12-22

## 2020-12-21 RX ORDER — DEXTROSE 50 % IN WATER 50 %
15 SYRINGE (ML) INTRAVENOUS ONCE
Refills: 0 | Status: DISCONTINUED | OUTPATIENT
Start: 2020-12-21 | End: 2020-12-22

## 2020-12-21 RX ORDER — AMLODIPINE BESYLATE 2.5 MG/1
5 TABLET ORAL DAILY
Refills: 0 | Status: DISCONTINUED | OUTPATIENT
Start: 2020-12-21 | End: 2020-12-21

## 2020-12-21 RX ORDER — SODIUM CHLORIDE 9 MG/ML
1000 INJECTION, SOLUTION INTRAVENOUS
Refills: 0 | Status: DISCONTINUED | OUTPATIENT
Start: 2020-12-21 | End: 2020-12-22

## 2020-12-21 RX ORDER — FAMOTIDINE 10 MG/ML
20 INJECTION INTRAVENOUS EVERY 12 HOURS
Refills: 0 | Status: DISCONTINUED | OUTPATIENT
Start: 2020-12-21 | End: 2020-12-22

## 2020-12-21 RX ORDER — HYDROMORPHONE HYDROCHLORIDE 2 MG/ML
0.5 INJECTION INTRAMUSCULAR; INTRAVENOUS; SUBCUTANEOUS EVERY 6 HOURS
Refills: 0 | Status: DISCONTINUED | OUTPATIENT
Start: 2020-12-21 | End: 2020-12-22

## 2020-12-21 RX ORDER — SODIUM CHLORIDE 9 MG/ML
1000 INJECTION INTRAMUSCULAR; INTRAVENOUS; SUBCUTANEOUS
Refills: 0 | Status: DISCONTINUED | OUTPATIENT
Start: 2020-12-21 | End: 2020-12-22

## 2020-12-21 RX ORDER — ACETAMINOPHEN 500 MG
650 TABLET ORAL EVERY 6 HOURS
Refills: 0 | Status: DISCONTINUED | OUTPATIENT
Start: 2020-12-21 | End: 2020-12-22

## 2020-12-21 RX ORDER — DEXTROSE 50 % IN WATER 50 %
12.5 SYRINGE (ML) INTRAVENOUS ONCE
Refills: 0 | Status: DISCONTINUED | OUTPATIENT
Start: 2020-12-21 | End: 2020-12-22

## 2020-12-21 RX ORDER — ASPIRIN/CALCIUM CARB/MAGNESIUM 324 MG
325 TABLET ORAL DAILY
Refills: 0 | Status: DISCONTINUED | OUTPATIENT
Start: 2020-12-22 | End: 2020-12-22

## 2020-12-21 RX ORDER — GLUCAGON INJECTION, SOLUTION 0.5 MG/.1ML
1 INJECTION, SOLUTION SUBCUTANEOUS ONCE
Refills: 0 | Status: DISCONTINUED | OUTPATIENT
Start: 2020-12-21 | End: 2020-12-22

## 2020-12-21 RX ORDER — FOLIC ACID 0.8 MG
1 TABLET ORAL DAILY
Refills: 0 | Status: DISCONTINUED | OUTPATIENT
Start: 2020-12-21 | End: 2020-12-22

## 2020-12-21 RX ORDER — ONDANSETRON 8 MG/1
4 TABLET, FILM COATED ORAL EVERY 6 HOURS
Refills: 0 | Status: DISCONTINUED | OUTPATIENT
Start: 2020-12-21 | End: 2020-12-21

## 2020-12-21 RX ORDER — SODIUM CHLORIDE 9 MG/ML
1000 INJECTION, SOLUTION INTRAVENOUS
Refills: 0 | Status: DISCONTINUED | OUTPATIENT
Start: 2020-12-21 | End: 2020-12-21

## 2020-12-21 RX ORDER — ASCORBIC ACID 60 MG
500 TABLET,CHEWABLE ORAL
Refills: 0 | Status: DISCONTINUED | OUTPATIENT
Start: 2020-12-21 | End: 2020-12-21

## 2020-12-21 RX ORDER — OXYCODONE HYDROCHLORIDE 5 MG/1
10 TABLET ORAL EVERY 4 HOURS
Refills: 0 | Status: DISCONTINUED | OUTPATIENT
Start: 2020-12-21 | End: 2020-12-22

## 2020-12-21 RX ORDER — ASPIRIN/CALCIUM CARB/MAGNESIUM 324 MG
1 TABLET ORAL
Qty: 30 | Refills: 0
Start: 2020-12-21 | End: 2021-01-19

## 2020-12-21 RX ORDER — HYDROMORPHONE HYDROCHLORIDE 2 MG/ML
0.5 INJECTION INTRAMUSCULAR; INTRAVENOUS; SUBCUTANEOUS EVERY 6 HOURS
Refills: 0 | Status: DISCONTINUED | OUTPATIENT
Start: 2020-12-21 | End: 2020-12-21

## 2020-12-21 RX ORDER — DEXTROSE 50 % IN WATER 50 %
25 SYRINGE (ML) INTRAVENOUS ONCE
Refills: 0 | Status: DISCONTINUED | OUTPATIENT
Start: 2020-12-21 | End: 2020-12-22

## 2020-12-21 RX ORDER — OXYCODONE HYDROCHLORIDE 5 MG/1
10 TABLET ORAL ONCE
Refills: 0 | Status: DISCONTINUED | OUTPATIENT
Start: 2020-12-21 | End: 2020-12-21

## 2020-12-21 RX ORDER — CEFAZOLIN SODIUM 1 G
2000 VIAL (EA) INJECTION EVERY 8 HOURS
Refills: 0 | Status: COMPLETED | OUTPATIENT
Start: 2020-12-21 | End: 2020-12-22

## 2020-12-21 RX ORDER — OXYCODONE HYDROCHLORIDE 5 MG/1
5 TABLET ORAL EVERY 4 HOURS
Refills: 0 | Status: DISCONTINUED | OUTPATIENT
Start: 2020-12-21 | End: 2020-12-22

## 2020-12-21 RX ADMIN — AMLODIPINE BESYLATE 5 MILLIGRAM(S): 2.5 TABLET ORAL at 10:39

## 2020-12-21 RX ADMIN — FAMOTIDINE 20 MILLIGRAM(S): 10 INJECTION INTRAVENOUS at 22:40

## 2020-12-21 RX ADMIN — Medication 100 MILLIGRAM(S): at 22:41

## 2020-12-21 RX ADMIN — PANTOPRAZOLE SODIUM 40 MILLIGRAM(S): 20 TABLET, DELAYED RELEASE ORAL at 10:39

## 2020-12-21 RX ADMIN — Medication 500 MILLIGRAM(S): at 22:40

## 2020-12-21 NOTE — DISCHARGE NOTE PROVIDER - NSDCFUADDINST_GEN_ALL_CORE_FT
Discharge Instructions for Repair of Right Quadriceps Tendon    1. Pain Control.  2. Non-Weight Bearing Right Lower Extremity  3. Elevate and ICE the extremity as much as possible  4. Keep dressing clean and dry. Do not get it wet.   5. Continue DVT/PE Prophylaxis as recommended by the Anticoagulation Team. See Med Rec.   6. Out of Bed Daily, try to keep moving.  7. MD to Remove Staples POD14 (1/4/21)   8. Follow up with Orthopedic Surgeon Dr. Miller in 10-14 Days. Call Office For Appointment.   Discharge Instructions for Repair of Right Quadriceps Tendon    1. Pain Control.  2. Non-Weight Bearing Right Lower Extremity in KNEE IMMOBILIZER at all times. NO KNEE BENDING at all.  3. Elevate and ICE the extremity as much as possible  4. Keep dressing clean and dry. Do not get it wet.   5. Continue DVT/PE Prophylaxis as recommended by the Anticoagulation Team. See Med Rec.   6. Out of Bed Daily, try to keep moving.  7. MD to Remove Staples POD14 (1/4/21)   8. Follow up with Orthopedic Surgeon Dr. Miller in 10-14 Days. Call Office For Appointment.

## 2020-12-21 NOTE — DISCHARGE NOTE PROVIDER - HOSPITAL COURSE
Orthopedic Summary  H&P:  Patient is a 65 year old male with a history of IDDM, HTN who suffered a mechanical fall on 12/3 and suffered a quadriceps tendon rupture. Patient states he was at work when he stepped awkwardly, heard an audible pop, and fell to the ground now with right knee pain and difficulty ambulating. Patient seen in [facility] ED following injury and was placed in knee immobilizer, now here for planned surgery tm with Dr. Miller for primary repair. Patient denies head strike/LOC. Patient denies numbness/tingling. Patient denies additional orthopedic injuries. Denies use of blood thinners. Patient is a normal community ambulator at baseline and works as a generator repairman.  PAST MEDICAL & SURGICAL HISTORY:  Lumbar spinal stenosis    OA (osteoarthritis)    Rupture quadriceps tendon  right    Hypercholesterolemia    HTN (hypertension)    DM (diabetes mellitus)    S/P cholecystectomy  open    S/P Achilles tendon repair    S/P laminectomy  lumbar         Now s/p Right Quadriceps Tendon Repair     Hospital Course:     The patient is a 65y Male status post above. Pt sustained injury from a fall on 12/3 and was admitted through the ED for planned surgery on 12/21. Prior to surgery Patient was medically optimized. Prophylactic antibiotics were started within 30 minutes before the procedure and continued for 24 hours.  There were no complications during the procedure.  The patient was transferred to recovery room in stable condition and subsequently to the orthopedic floor.  Patient was placed on anticoagulation for DVT/PE prophylaxis per the Anticoagulation Team. All home medications were continued.  Physical therapy daily and daily labs were followed.  The knee immobilizer was kept clean, dry, intact. The rest of the hospital stay was unremarkable. Pt received PT daily and was Discharged once cleared per PT.  The orthopedic Attending is aware and agrees. See addendum to DC summary per medical team below for any additional info or if any changes.

## 2020-12-21 NOTE — PROGRESS NOTE ADULT - SUBJECTIVE AND OBJECTIVE BOX
pcp- fruth  cardio john roseband    HPI:  64 y/o male with h/o IDDM a1c-8.0, HTN, was sent by orthopedics for medical clearance prior to procedure tomorrow for right quadriceps repair.  no acute complaints. on 12/3 while at work, he heard a pop from his right leg then he fell,   seen by cardio in 7/2020.  workup was normal as per patient.  no sob or joaquin, chest pain.    non-smoker    12/21: seen at bedside, NPO for OR today, denies any Cp or SOB      all ROS negative except where mentioned      Vital Signs Last 24 Hrs  T(C): 36.6 (12-21-20 @ 09:33), Max: 36.7 (12-21-20 @ 00:17)  T(F): 97.9 (12-21-20 @ 09:33), Max: 98 (12-21-20 @ 00:17)  HR: 57 (12-21-20 @ 09:33) (57 - 68)  BP: 130/65 (12-21-20 @ 09:33) (120/63 - 130/65)  BP(mean): --  RR: 18 (12-21-20 @ 09:33) (16 - 18)  SpO2: 97% (12-21-20 @ 09:33) (97% - 99%)          PHYSICAL EXAM:    GENERAL: Comfortable, no acute distress   HEAD:  Normocephalic, atraumatic  EYES: EOMI, PERRLA  HEENT: Moist mucous membranes  NECK: Supple, No JVD  NERVOUS SYSTEM:  Alert & Oriented X3, Motor Strength 5/5 B/L upper and lower extremities  CHEST/LUNG: Clear to auscultation bilaterally  HEART: Regular rate and rhythm  ABDOMEN: Soft, non tender, Nondistended, Bowel sounds present,  GENITOURINARY: Voiding, no palpable bladder  EXTREMITIES:   No clubbing, cyanosis, or edema  MUSCULOSKELTAL- RLE in immobolizer, + muscle tenderness, no joint tenderness  SKIN-no rash    MEDICATIONS  (STANDING):  amLODIPine   Tablet 5 milliGRAM(s) Oral daily  ascorbic acid 500 milliGRAM(s) Oral two times a day  dextrose 40% Gel 15 Gram(s) Oral once  dextrose 5%. 1000 milliLiter(s) (50 mL/Hr) IV Continuous <Continuous>  dextrose 5%. 1000 milliLiter(s) (100 mL/Hr) IV Continuous <Continuous>  dextrose 50% Injectable 25 Gram(s) IV Push once  dextrose 50% Injectable 12.5 Gram(s) IV Push once  dextrose 50% Injectable 25 Gram(s) IV Push once  folic acid 1 milliGRAM(s) Oral daily  glucagon  Injectable 1 milliGRAM(s) IntraMuscular once  insulin lispro (ADMELOG) corrective regimen sliding scale   SubCutaneous three times a day before meals  lactated ringers. 1000 milliLiter(s) (75 mL/Hr) IV Continuous <Continuous>  multivitamin 1 Tablet(s) Oral daily  pantoprazole    Tablet 40 milliGRAM(s) Oral before breakfast    MEDICATIONS  (PRN):  acetaminophen   Tablet .. 650 milliGRAM(s) Oral every 6 hours PRN Temp greater or equal to 38C (100.4F), Mild Pain (1 - 3)  HYDROmorphone  Injectable 0.5 milliGRAM(s) IV Push every 6 hours PRN Severe breakthrough  magnesium hydroxide Suspension 30 milliLiter(s) Oral daily PRN Constipation  oxyCODONE    IR 10 milliGRAM(s) Oral every 4 hours PRN Severe Pain (7 - 10)  oxyCODONE    IR 5 milliGRAM(s) Oral every 4 hours PRN Moderate Pain (4 - 6)                          13.1   6.46  )-----------( 326      ( 21 Dec 2020 05:45 )             39.8       12-21    141  |  109<H>  |  23  ----------------------------<  148<H>  4.2   |  27  |  1.14    Ca    8.9      21 Dec 2020 05:45        PT/INR - ( 21 Dec 2020 05:45 )   PT: 13.7 sec;   INR: 1.19 ratio         PTT - ( 21 Dec 2020 05:45 )  PTT:27.7 sec      IMP: 66 yo male with above pmh a/w:    # right quadriceps rupture.   pain control   IVF's  NPO  plan for repair in OR today  patient is moderate risk candidate undergoing moderate risk procedure.    no absolute contraindication therefore medically cleared for procedure tomorrow    Stuart score- 0.3 %-Risk of myocardial infarction or cardiac arrest, intraoperatively or up to 30 days post-op  -care as per ortho     HTN  -cont amlodipine,   -hold valsartan/HCTZ in light of mild elevated creatinine yesterday, now resolved with fluids     IDDM,   last a1c-8.0. on novolin 70/30 45 units BID at home  -npo after mn  -sliding scale and BGM     dvt prophylaixs   hold chemical prophylaxis for now  resume post op  venodynes LLE for now     pcp- fruth  cardio john roseband    HPI:  66 y/o male with h/o IDDM a1c-8.0, HTN, was sent by orthopedics for medical clearance prior to procedure tomorrow for right quadriceps repair.  no acute complaints. on 12/3 while at work, he heard a pop from his right leg then he fell,   seen by cardio in 7/2020.  workup was normal as per patient.  no sob or joaquin, chest pain.    non-smoker    12/21: seen at bedside, NPO for OR today, denies any Cp or SOB      all ROS negative except where mentioned      Vital Signs Last 24 Hrs  T(C): 36.6 (12-21-20 @ 09:33), Max: 36.7 (12-21-20 @ 00:17)  T(F): 97.9 (12-21-20 @ 09:33), Max: 98 (12-21-20 @ 00:17)  HR: 57 (12-21-20 @ 09:33) (57 - 68)  BP: 130/65 (12-21-20 @ 09:33) (120/63 - 130/65)  BP(mean): --  RR: 18 (12-21-20 @ 09:33) (16 - 18)  SpO2: 97% (12-21-20 @ 09:33) (97% - 99%)          PHYSICAL EXAM:    GENERAL: Comfortable, no acute distress   HEAD:  Normocephalic, atraumatic  EYES: EOMI, PERRLA  HEENT: Moist mucous membranes  NECK: Supple, No JVD  NERVOUS SYSTEM:  Alert & Oriented X3, Motor Strength 5/5 B/L upper and lower extremities  CHEST/LUNG: Clear to auscultation bilaterally  HEART: Regular rate and rhythm  ABDOMEN: Soft, non tender, Nondistended, Bowel sounds present,  GENITOURINARY: Voiding, no palpable bladder  EXTREMITIES:   No clubbing, cyanosis, or edema  MUSCULOSKELTAL- RLE in immobolizer, + muscle tenderness, no joint tenderness  SKIN-no rash    MEDICATIONS  (STANDING):  amLODIPine   Tablet 5 milliGRAM(s) Oral daily  ascorbic acid 500 milliGRAM(s) Oral two times a day  dextrose 40% Gel 15 Gram(s) Oral once  dextrose 5%. 1000 milliLiter(s) (50 mL/Hr) IV Continuous <Continuous>  dextrose 5%. 1000 milliLiter(s) (100 mL/Hr) IV Continuous <Continuous>  dextrose 50% Injectable 25 Gram(s) IV Push once  dextrose 50% Injectable 12.5 Gram(s) IV Push once  dextrose 50% Injectable 25 Gram(s) IV Push once  folic acid 1 milliGRAM(s) Oral daily  glucagon  Injectable 1 milliGRAM(s) IntraMuscular once  insulin lispro (ADMELOG) corrective regimen sliding scale   SubCutaneous three times a day before meals  lactated ringers. 1000 milliLiter(s) (75 mL/Hr) IV Continuous <Continuous>  multivitamin 1 Tablet(s) Oral daily  pantoprazole    Tablet 40 milliGRAM(s) Oral before breakfast    MEDICATIONS  (PRN):  acetaminophen   Tablet .. 650 milliGRAM(s) Oral every 6 hours PRN Temp greater or equal to 38C (100.4F), Mild Pain (1 - 3)  HYDROmorphone  Injectable 0.5 milliGRAM(s) IV Push every 6 hours PRN Severe breakthrough  magnesium hydroxide Suspension 30 milliLiter(s) Oral daily PRN Constipation  oxyCODONE    IR 10 milliGRAM(s) Oral every 4 hours PRN Severe Pain (7 - 10)  oxyCODONE    IR 5 milliGRAM(s) Oral every 4 hours PRN Moderate Pain (4 - 6)                          13.1   6.46  )-----------( 326      ( 21 Dec 2020 05:45 )             39.8       12-21    141  |  109<H>  |  23  ----------------------------<  148<H>  4.2   |  27  |  1.14    Ca    8.9      21 Dec 2020 05:45        PT/INR - ( 21 Dec 2020 05:45 )   PT: 13.7 sec;   INR: 1.19 ratio         PTT - ( 21 Dec 2020 05:45 )  PTT:27.7 sec      IMP: 66 yo male with above pmh a/w:    # right quadriceps rupture.   pain control   IVF's  NPO  plan for repair in OR today  patient is moderate risk candidate undergoing moderate risk procedure.    no absolute contraindication therefore medically cleared   Stuart score- 0.3 %-Risk of myocardial infarction or cardiac arrest, intraoperatively or up to 30 days post-op  -care as per ortho     HTN  -cont amlodipine,   -hold valsartan/HCTZ in light of mild elevated creatinine yesterday, now resolved with fluids     IDDM,   last a1c-8.0. on novolin 70/30 45 units BID at home  -npo after mn  -sliding scale and BGM     dvt prophylaixs   hold chemical prophylaxis for now  resume post op  venodynes LLE for now

## 2020-12-21 NOTE — PROGRESS NOTE ADULT - ASSESSMENT
A/P: 65 year old male with right quadriceps tendon rupture  - Plan for primary surgical repair tomorrow 12/21   - NPO except  - Admission labs noted  - FU preop labs tomorrow morning  - WBAT RLE with knee immobilizer  - Medical care appreciated  - Hold metformin/novolin, hold HCTZ/losartan per medicine   - Glucose control with ISS  - Medically optimized for procedure   - Discussed with attending who agrees with plan, will update as needed.

## 2020-12-21 NOTE — PROGRESS NOTE ADULT - SUBJECTIVE AND OBJECTIVE BOX
Pt seen and examined at bedside this morning. No complaints.    PAST MEDICAL & SURGICAL HISTORY:    Home Medications:    Allergies    No Known Allergies    Intolerances    Vital Signs Last 24 Hrs  T(C): 36.7 (21 Dec 2020 00:17), Max: 36.8 (20 Dec 2020 09:57)  T(F): 98 (21 Dec 2020 00:17), Max: 98.3 (20 Dec 2020 09:57)  HR: 58 (21 Dec 2020 00:17) (58 - 68)  BP: 120/64 (21 Dec 2020 00:17) (120/63 - 136/77)  BP(mean): 100 (20 Dec 2020 09:57) (100 - 100)  RR: 16 (21 Dec 2020 00:17) (16 - 18)  SpO2: 98% (21 Dec 2020 00:17) (97% - 99%)    PHYSICAL EXAM  General: NAD, Awake and Alert  RLE:  No gross deformity noted, however significant palpable defect felt over the quad tendon insertion  Unable to straight leg raise  +sensation L2-S1  +dorsiflexion/plantarflexion of ankle/hallux  +dorsalis pedis pulse  Soft compartments, - calf tenderness

## 2020-12-21 NOTE — PROGRESS NOTE ADULT - ATTENDING COMMENTS
Patient is seen and examined at bedside with NP Ana Bansk. NPO for OR today. Pain is ok controlled. Agree with above assessment and plan. D/w pt and ortho team

## 2020-12-21 NOTE — DISCHARGE NOTE PROVIDER - NSDCCPCAREPLAN_GEN_ALL_CORE_FT
PRINCIPAL DISCHARGE DIAGNOSIS  Diagnosis: Quadriceps tendon rupture  Assessment and Plan of Treatment:

## 2020-12-21 NOTE — DISCHARGE NOTE PROVIDER - NSDCMRMEDTOKEN_GEN_ALL_CORE_FT
amLODIPine 5 mg oral tablet: 1 tab(s) orally once a day  amoxicillin 500 mg oral capsule: 1 cap(s) orally 3 times a day  Ecotrin 325 mg oral delayed release tablet: 1 tab(s) orally once a day from post-operative day1-30  metFORMIN 1000 mg oral tablet: 1 tab(s) orally 2 times a day  hold 24 hrs before surgery  oxycodone-acetaminophen 5 mg-325 mg oral tablet: 1 tab(s) orally every 6 hours, As Needed -for severe pain MDD:4  Relion NovoLIN 70/30 [place] subcutaneous suspension: 45 unit(s) subcutaneous 2 times a day  valsartan-hydrochlorothiazide 160mg-12.5mg oral tablet: 1 tab(s) orally once a day   amLODIPine 5 mg oral tablet: 1 tab(s) orally once a day  amoxicillin 500 mg oral capsule: 1 cap(s) orally 3 times a day  enoxaparin 40 mg/0.4 mL injectable solution: Inject on syringe into abdomen once daily as directed by [facility] [phone] MDD:40mg  metFORMIN 1000 mg oral tablet: 1 tab(s) orally 2 times a day  hold 24 hrs before surgery  oxycodone-acetaminophen 5 mg-325 mg oral tablet: 1 tab(s) orally every 6 hours, As Needed -for severe pain MDD:4  Relion NovoLIN 70/30 [place] subcutaneous suspension: 45 unit(s) subcutaneous 2 times a day  valsartan-hydrochlorothiazide 160mg-12.5mg oral tablet: 1 tab(s) orally once a day

## 2020-12-21 NOTE — PROGRESS NOTE ADULT - SUBJECTIVE AND OBJECTIVE BOX
Orthopedics Post-op Check  POD 0  Patient seen and examined at bedside. Pain is well controlled. No nausea or vomiting. Patient tolerated the procedure well.     Vital Signs Last 24 Hrs  T(C): 36.6 (12-21-20 @ 17:24), Max: 36.7 (12-21-20 @ 00:17)  T(F): 97.8 (12-21-20 @ 17:24), Max: 98 (12-21-20 @ 00:17)  HR: 64 (12-21-20 @ 17:24) (57 - 638)  BP: 130/56 (12-21-20 @ 17:24) (120/63 - 140/72)  BP(mean): --  RR: 18 (12-21-20 @ 17:24) (12 - 18)  SpO2: 98% (12-21-20 @ 17:24) (97% - 100%)                        13.5   12.68 )-----------( 359      ( 21 Dec 2020 17:26 )             41.7     21 Dec 2020 17:26    139    |  107    |  18     ----------------------------<  158    4.4     |  26     |  1.20     Ca    8.7        21 Dec 2020 17:26      PT/INR - ( 21 Dec 2020 05:45 )   PT: 13.7 sec;   INR: 1.19 ratio         PTT - ( 21 Dec 2020 05:45 )  PTT:27.7 sec    Exam:  Gen: NAD, resting comfortably  RLE:  Dressing c/d/i  Knee Immobilizer   +EHL/FHL/TA/GS  SILT L4-S1  +DP/PT 2+  Calf NTTP b/l  Compartments soft and compressible    A/P:  65yMale Stable POD 0  s/p Right quadriceps tendon repair     -Post-op labs noted  -NWB RLE in knee immobilizer   -Pain control  -PT/OT  -Continue perioperative abx  -DVT PE ppx- begin  qd tomorrow   -Incentive spirometry  -Ice/elevation to extremity  -Dispo planning; patient anticipated discharge home tomorrow   -Will discuss with attending and change plan as needed.

## 2020-12-21 NOTE — DISCHARGE NOTE PROVIDER - CARE PROVIDER_API CALL
Russ Miller (DO)  Orthopaedic Surgery  [address] [place] [address]  Phone: [phone]  Fax: [phone]  Follow Up Time:

## 2020-12-22 ENCOUNTER — TRANSCRIPTION ENCOUNTER (OUTPATIENT)
Age: 65
End: 2020-12-22

## 2020-12-22 VITALS
DIASTOLIC BLOOD PRESSURE: 52 MMHG | HEART RATE: 66 BPM | RESPIRATION RATE: 18 BRPM | SYSTOLIC BLOOD PRESSURE: 135 MMHG | TEMPERATURE: 98 F | OXYGEN SATURATION: 99 %

## 2020-12-22 LAB
ANION GAP SERPL CALC-SCNC: 8 MMOL/L — SIGNIFICANT CHANGE UP (ref 5–17)
BUN SERPL-MCNC: 23 MG/DL — SIGNIFICANT CHANGE UP (ref 7–23)
CALCIUM SERPL-MCNC: 8.7 MG/DL — SIGNIFICANT CHANGE UP (ref 8.5–10.1)
CHLORIDE SERPL-SCNC: 107 MMOL/L — SIGNIFICANT CHANGE UP (ref 96–108)
CO2 SERPL-SCNC: 24 MMOL/L — SIGNIFICANT CHANGE UP (ref 22–31)
CREAT SERPL-MCNC: 1.32 MG/DL — HIGH (ref 0.5–1.3)
GLUCOSE SERPL-MCNC: 229 MG/DL — HIGH (ref 70–99)
HCT VFR BLD CALC: 39.1 % — SIGNIFICANT CHANGE UP (ref 39–50)
HGB BLD-MCNC: 13.1 G/DL — SIGNIFICANT CHANGE UP (ref 13–17)
MCHC RBC-ENTMCNC: 32.7 PG — SIGNIFICANT CHANGE UP (ref 27–34)
MCHC RBC-ENTMCNC: 33.5 GM/DL — SIGNIFICANT CHANGE UP (ref 32–36)
MCV RBC AUTO: 97.5 FL — SIGNIFICANT CHANGE UP (ref 80–100)
PLATELET # BLD AUTO: 347 K/UL — SIGNIFICANT CHANGE UP (ref 150–400)
POTASSIUM SERPL-MCNC: 4.3 MMOL/L — SIGNIFICANT CHANGE UP (ref 3.5–5.3)
POTASSIUM SERPL-SCNC: 4.3 MMOL/L — SIGNIFICANT CHANGE UP (ref 3.5–5.3)
RBC # BLD: 4.01 M/UL — LOW (ref 4.2–5.8)
RBC # FLD: 13.3 % — SIGNIFICANT CHANGE UP (ref 10.3–14.5)
SODIUM SERPL-SCNC: 139 MMOL/L — SIGNIFICANT CHANGE UP (ref 135–145)
WBC # BLD: 10.79 K/UL — HIGH (ref 3.8–10.5)
WBC # FLD AUTO: 10.79 K/UL — HIGH (ref 3.8–10.5)

## 2020-12-22 PROCEDURE — 99223 1ST HOSP IP/OBS HIGH 75: CPT

## 2020-12-22 PROCEDURE — 99232 SBSQ HOSP IP/OBS MODERATE 35: CPT

## 2020-12-22 RX ORDER — ENOXAPARIN SODIUM 100 MG/ML
40 INJECTION SUBCUTANEOUS
Qty: 30 | Refills: 1
Start: 2020-12-22 | End: 2021-02-19

## 2020-12-22 RX ORDER — ENOXAPARIN SODIUM 100 MG/ML
40 INJECTION SUBCUTANEOUS DAILY
Refills: 0 | Status: DISCONTINUED | OUTPATIENT
Start: 2020-12-22 | End: 2020-12-22

## 2020-12-22 RX ORDER — INSULIN LISPRO 100/ML
VIAL (ML) SUBCUTANEOUS AT BEDTIME
Refills: 0 | Status: DISCONTINUED | OUTPATIENT
Start: 2020-12-22 | End: 2020-12-22

## 2020-12-22 RX ADMIN — Medication 1 MILLIGRAM(S): at 09:32

## 2020-12-22 RX ADMIN — Medication 1 TABLET(S): at 09:32

## 2020-12-22 RX ADMIN — AMLODIPINE BESYLATE 5 MILLIGRAM(S): 2.5 TABLET ORAL at 09:32

## 2020-12-22 RX ADMIN — Medication 100 MILLIGRAM(S): at 06:28

## 2020-12-22 RX ADMIN — Medication 2: at 07:58

## 2020-12-22 RX ADMIN — ENOXAPARIN SODIUM 40 MILLIGRAM(S): 100 INJECTION SUBCUTANEOUS at 11:59

## 2020-12-22 RX ADMIN — Medication 500 MILLIGRAM(S): at 09:32

## 2020-12-22 RX ADMIN — Medication 325 MILLIGRAM(S): at 09:33

## 2020-12-22 RX ADMIN — FAMOTIDINE 20 MILLIGRAM(S): 10 INJECTION INTRAVENOUS at 09:32

## 2020-12-22 NOTE — PHYSICAL THERAPY INITIAL EVALUATION ADULT - DIAGNOSIS, PT EVAL
65 year old male s/p Right Quad Tendon Repair POD 1 by  65 year old male s/p Right Quad Tendon Repair POD 1 by Dr. Miller

## 2020-12-22 NOTE — CONSULT NOTE ADULT - ASSESSMENT
This is a 65 year old male s/p right quadricep repair with proximal fibular fracture and moderate risk VTE due to non-weightbearing status, BMI, age, surgery. Low bleeding risk.    Plan:  ::Lovenox 40 mg SQ daily until weight bearing  ::Daily CBC/BMP  ::Enc ambulation    Thank you for this consult, will continue to follow.  Dispo: Home

## 2020-12-22 NOTE — PHYSICAL THERAPY INITIAL EVALUATION ADULT - RANGE OF MOTION EXAMINATION, REHAB EVAL
Right knee secured in the knee immobilizer, NO ROM tested on the right LE knee./no ROM deficits were identified
Right knee secured in the knee immobilizer, NO ROM tested on the right LE knee./no ROM deficits were identified

## 2020-12-22 NOTE — PHYSICAL THERAPY INITIAL EVALUATION ADULT - IMPAIRMENTS CONTRIBUTING TO GAIT DEVIATIONS, PT EVAL
impaired coordination/decreased flexibility/impaired postural control
impaired coordination/decreased flexibility/impaired postural control

## 2020-12-22 NOTE — PHYSICAL THERAPY INITIAL EVALUATION ADULT - PERTINENT HX OF CURRENT PROBLEM, REHAB EVAL
65 year old male with a history of IDDM, HTN who suffered a mechanical fall on 12/3 and suffered a quadriceps tendon rupture. Patient states he was at work when he stepped awkwardly, heard an audible pop, and fell to the ground now with right knee pain and difficulty ambulating. Patient seen in [place] ED following injury and was placed in knee immobilizer, now here for planned surgery tm with Dr. Miller for primary repair.
65 year old male with a history of IDDM, HTN who suffered a mechanical fall on 12/3 and suffered a quadriceps tendon rupture. Patient states he was at work when he stepped awkwardly, heard an audible pop, and fell to the ground now with right knee pain and difficulty ambulating. Patient seen in [place] ED following injury and was placed in knee immobilizer, now here for planned surgery tm with Dr. Miller for primary repair.

## 2020-12-22 NOTE — DISCHARGE NOTE NURSING/CASE MANAGEMENT/SOCIAL WORK - PATIENT PORTAL LINK FT
You can access the FollowMyHealth Patient Portal offered by [facility] by registering at the following website: [email or web address]. By joining [facility]’s FollowMyHealth portal, you will also be able to view your health information using other applications (apps) compatible with our system.

## 2020-12-22 NOTE — PHYSICAL THERAPY INITIAL EVALUATION ADULT - PLANNED THERAPY INTERVENTIONS, PT EVAL
bed mobility training/gait training/postural re-education/transfer training
bed mobility training/gait training/postural re-education/transfer training

## 2020-12-22 NOTE — PHYSICAL THERAPY INITIAL EVALUATION ADULT - MODALITIES TREATMENT COMMENTS
The pt demonstrated good overall activity tolerance, responding well to therex review, transfer and ambulation tx. The pt was returned to supine and adjusted for comfort in bed, anticipating OR/ Sx later today. The pt was in NAD at end of tx.
The pt demonstrated good overall activity tolerance, responding well to therex review, transfer and ambulation tx. The pt was left sitting OOB and in a chair with chair alarm secured, RN aware. CB, tray and phone in place. The pt was in NAD at end of tx.

## 2020-12-22 NOTE — PHYSICAL THERAPY INITIAL EVALUATION ADULT - PATIENT/FAMILY/SIGNIFICANT OTHER GOALS STATEMENT, PT EVAL
The pt hopes to go home asap/ immediately after sx.
The pt hopes to go home asap/ immediately after sx.

## 2020-12-22 NOTE — PROGRESS NOTE ADULT - SUBJECTIVE AND OBJECTIVE BOX
pcp- fruth  cardio john roseband    HPI:  64 y/o male with h/o IDDM a1c-8.0, HTN, was sent by orthopedics for medical clearance prior to procedure tomorrow for right quadriceps repair.  no acute complaints. on 12/3 while at work, he heard a pop from his right leg then he fell,   seen by cardio in 7/2020.  workup was normal as per patient.  no sob or joaquin, chest pain.    non-smoker    12/21: seen at bedside, NPO for OR today, denies any Cp or SOB  12/22 up in a chair, doing good. Did good with PT. Going home today    all ROS negative except where mentioned    Vital Signs Last 24 Hrs  T(C): 36.7 (22 Dec 2020 08:46), Max: 36.9 (21 Dec 2020 20:19)  T(F): 98.1 (22 Dec 2020 08:46), Max: 98.5 (21 Dec 2020 20:19)  HR: 66 (22 Dec 2020 08:46) (60 - 638)  BP: 135/52 (22 Dec 2020 08:46) (125/57 - 140/72)  BP(mean): --  RR: 18 (22 Dec 2020 08:46) (12 - 18)  SpO2: 99% (22 Dec 2020 08:46) (96% - 100%)    LABS:                        13.1   10.79 )-----------( 347      ( 22 Dec 2020 06:46 )             39.1     22 Dec 2020 06:46    139    |  107    |  23     ----------------------------<  229    4.3     |  24     |  1.32     Ca    8.7        22 Dec 2020 06:46    PT/INR - ( 21 Dec 2020 05:45 )   PT: 13.7 sec;   INR: 1.19 ratio      PTT - ( 21 Dec 2020 05:45 )  PTT:27.7 sec    CAPILLARY BLOOD GLUCOSE  POCT Blood Glucose.: 218 mg/dL (22 Dec 2020 07:49)  POCT Blood Glucose.: 257 mg/dL (22 Dec 2020 01:28)  POCT Blood Glucose.: 143 mg/dL (21 Dec 2020 17:11)    PHYSICAL EXAM:  GENERAL: Comfortable, no acute distress   HEAD:  Normocephalic, atraumatic  EYES: EOMI, PERRLA  HEENT: Moist mucous membranes  NECK: Supple, No JVD  NERVOUS SYSTEM:  Alert & Oriented X3, Motor Strength 5/5 B/L upper and lower extremities  CHEST/LUNG: Clear to auscultation bilaterally  HEART: Regular rate and rhythm  ABDOMEN: Soft, non tender, Nondistended, Bowel sounds present,  GENITOURINARY: Voiding, no palpable bladder  EXTREMITIES:   No clubbing, cyanosis, or edema  MUSCULOSKELTAL- RLE in immobilizer, dressing dry  SKIN-no rash    MEDICATIONS  (STANDING):  amLODIPine   Tablet 5 milliGRAM(s) Oral daily  ascorbic acid 500 milliGRAM(s) Oral two times a day  dextrose 40% Gel 15 Gram(s) Oral once  dextrose 40% Gel 15 Gram(s) Oral once  dextrose 5%. 1000 milliLiter(s) (50 mL/Hr) IV Continuous <Continuous>  dextrose 5%. 1000 milliLiter(s) (100 mL/Hr) IV Continuous <Continuous>  dextrose 5%. 1000 milliLiter(s) (50 mL/Hr) IV Continuous <Continuous>  dextrose 5%. 1000 milliLiter(s) (100 mL/Hr) IV Continuous <Continuous>  dextrose 50% Injectable 25 Gram(s) IV Push once  dextrose 50% Injectable 12.5 Gram(s) IV Push once  dextrose 50% Injectable 25 Gram(s) IV Push once  dextrose 50% Injectable 25 Gram(s) IV Push once  dextrose 50% Injectable 12.5 Gram(s) IV Push once  dextrose 50% Injectable 25 Gram(s) IV Push once  enoxaparin Injectable 40 milliGRAM(s) SubCutaneous daily  famotidine    Tablet 20 milliGRAM(s) Oral every 12 hours  folic acid 1 milliGRAM(s) Oral daily  glucagon  Injectable 1 milliGRAM(s) IntraMuscular once  glucagon  Injectable 1 milliGRAM(s) IntraMuscular once  insulin lispro (ADMELOG) corrective regimen sliding scale   SubCutaneous three times a day before meals  insulin lispro (ADMELOG) corrective regimen sliding scale   SubCutaneous at bedtime  lactated ringers. 1000 milliLiter(s) (75 mL/Hr) IV Continuous <Continuous>  multivitamin 1 Tablet(s) Oral daily  sodium chloride 0.9%. 1000 milliLiter(s) (75 mL/Hr) IV Continuous <Continuous>    MEDICATIONS  (PRN):  acetaminophen   Tablet .. 650 milliGRAM(s) Oral every 6 hours PRN Temp greater or equal to 38C (100.4F), Mild Pain (1 - 3)  benzocaine 15 mG/menthol 3.6 mG (Sugar-Free) Lozenge 1 Lozenge Oral every 2 hours PRN Sore Throat  HYDROmorphone  Injectable 0.5 milliGRAM(s) IV Push every 6 hours PRN severe breakthru Pain  magnesium hydroxide Suspension 30 milliLiter(s) Oral daily PRN Constipation  oxyCODONE    IR 10 milliGRAM(s) Oral every 4 hours PRN Severe Pain (7 - 10)  oxyCODONE    IR 5 milliGRAM(s) Oral every 4 hours PRN Moderate Pain (4 - 6)    IMP: 64 yo male with above pmh a/w:    #right quadriceps rupture- s/p repair  Ortho f/u appreciated  PT- NWB to RLE  Pain meds prn  Incentive spirometry  Bowel regimen    #HTN  resume home meds on discharge    #IDDM,  Resume home meds on discharge    #dvt prophylaixs Lovenox SQ    #Dispo- medically stable for discharge home today. D/w pt and ortho team

## 2020-12-22 NOTE — PHYSICAL THERAPY INITIAL EVALUATION ADULT - ADDITIONAL COMMENTS
1 step to enter home, several steps inside the home with rails.
1 step to enter home, several steps inside the home with rails.

## 2020-12-22 NOTE — PROGRESS NOTE ADULT - REASON FOR ADMISSION
Right quadriceps tendon repair

## 2020-12-22 NOTE — PHYSICAL THERAPY INITIAL EVALUATION ADULT - MANUAL MUSCLE TESTING RESULTS, REHAB EVAL
Right LE grossly 3-/5, all other extremities grossly 3+/5 to WFL/WNL/grossly assessed due to
Right LE grossly 3-/5, all other extremities grossly 3+/5 to WFL/WNL/grossly assessed due to

## 2020-12-22 NOTE — CONSULT NOTE ADULT - SUBJECTIVE AND OBJECTIVE BOX
pcp- fruth  cardio john ortiz    64 y/o male with h/o IDDM a1c-8.0, HTN, was sent by orthopedics for medical clearance prior to procedure tomorrow for right quadriceps repair.  no acute complaints. on 12/3 while at work, he heard a pop from his right leg then he fell,   seen by cardio in 7/2020.  workup was normal as per patient.  no soboe, chest pain.    non-smoker    PAST MEDICAL HISTORY:  DM (diabetes mellitus)   HTN (hypertension)   Hypercholesterolemia   Lumbar spinal stenosis   OA (osteoarthritis)   Rupture quadriceps tendon right.     PAST SURGICAL HISTORY:  S/P Achilles tendon repair   S/P cholecystectomy open  S/P laminectomy lumbar.     FAMILY HISTORY:  Family history of Alzheimer's disease, mother  FH: CHF (congestive heart failure), mother.    SOCIAL HISTORY  non-smoker.  denies alcohol and drug use     Requesting Physician:    Reason for Consult:    Chief Complaint:    HPI:      REVIEW OF SYSTEMS:    CONSTITUTIONAL: No weakness, fevers or chills  EYES/ENT: No visual changes;  No vertigo or throat pain   NECK: No pain or stiffness  RESPIRATORY: No cough, wheezing, hemoptysis; No shortness of breath  CARDIOVASCULAR: No chest pain or palpitations  GASTROINTESTINAL: No abdominal or epigastric pain. No nausea, vomiting, or hematemesis; No diarrhea or constipation. No melena or hematochezia.  GENITOURINARY: No dysuria, frequency or hematuria  NEUROLOGICAL: No numbness or weakness  SKIN: No itching, burning, rashes, or lesions   All other review of systems is negative unless indicated above    Vital Signs Last 24 Hrs  T(C): 36.8 (20 Dec 2020 09:57), Max: 36.8 (20 Dec 2020 09:57)  T(F): 98.3 (20 Dec 2020 09:57), Max: 98.3 (20 Dec 2020 09:57)  HR: 63 (20 Dec 2020 09:57) (63 - 63)  BP: 136/77 (20 Dec 2020 09:57) (136/77 - 136/77)  BP(mean): 100 (20 Dec 2020 09:57) (100 - 100)  RR: 18 (20 Dec 2020 09:57) (18 - 18)  SpO2: 98% (20 Dec 2020 09:57) (98% - 98%)    I&O's Summary      CAPILLARY BLOOD GLUCOSE          PHYSICAL EXAM:    Constitutional: NAD, awake and alert, well-developed  Neck: Soft and supple,  No JVD  Respiratory: Breath sounds are clear bilaterally, No wheezing, rales or rhonchi  Cardiovascular: S1 and S2, regular rate and rhythm, no Murmurs, gallops or rubs  Gastrointestinal: Bowel Sounds present, soft, nontender, nondistended, no guarding, no rebound  Extremities: No peripheral edema.  right l;eg in splint  Vascular: 2+ peripheral pulses  Neurological: A/O x 3, no focal deficits  Musculoskeletal: 5/5 strength b/l upper extremity.  able to lift right leg   Skin: No rashes    Medications:  MEDICATIONS  (STANDING):  amLODIPine   Tablet 5 milliGRAM(s) Oral daily  dextrose 40% Gel 15 Gram(s) Oral once  dextrose 5%. 1000 milliLiter(s) (50 mL/Hr) IV Continuous <Continuous>  dextrose 5%. 1000 milliLiter(s) (100 mL/Hr) IV Continuous <Continuous>  dextrose 50% Injectable 25 Gram(s) IV Push once  dextrose 50% Injectable 12.5 Gram(s) IV Push once  dextrose 50% Injectable 25 Gram(s) IV Push once  glucagon  Injectable 1 milliGRAM(s) IntraMuscular once      Labs: All Labs Reviewed:                        14.7   7.48  )-----------( 389      ( 20 Dec 2020 10:51 )             45.0     12-20    139  |  107  |  20  ----------------------------<  176<H>  4.7   |  28  |  1.38<H>    Ca    9.4      20 Dec 2020 10:51      PT/INR - ( 20 Dec 2020 10:51 )   PT: 13.0 sec;   INR: 1.12 ratio    PTT - ( 20 Dec 2020 10:51 )  PTT:29.2 sec      
HPI:  Patient is a 65 year old male with a history of IDDM, HTN who suffered a mechanical fall on 12/3 and suffered a quadriceps tendon rupture. Patient states he was at work when he stepped awkwardly, heard an audible pop, and fell to the ground now with right knee pain and difficulty ambulating. Patient seen in [place] ED following injury and was placed in knee immobilizer, now here for planned surgery tm with Dr. Miller for primary repair. Patient denies head strike/LOC. Patient denies numbness/tingling. Patient denies additional orthopedic injuries. Denies use of blood thinners. Patient is a normal community ambulator at baseline and works as a generator repairman. (20 Dec 2020 14:22)    Patient is a 65y old  Male who presents with a chief complaint of right leg pain sustaining right proximal fibular fracture and POD #1 right quadricep tendon repair.    Consulted by Dr. Miller for VTE prophylaxis, risk stratification, and anticoagulation management.    PAST MEDICAL & SURGICAL HISTORY:  Lumbar spinal stenosis  OA (osteoarthritis)  Rupture quadriceps tendon  right  Hypercholesterolemia  HTN (hypertension)  DM (diabetes mellitus)  S/P cholecystectomy  open  S/P Achilles tendon repair  S/P laminectomy  lumbar    Interval History:  : Patient seen at bedside preparing to go home today. He is s/p right quad tendon repair but also has sustained a right proximal fibular fracture which has not been mentioned in history. See xray below. Clarified with ortho- he is non-weight bearing.  He is an insulin-dependent diabetic and has no issues with Lovenox injection. Explained necessity. He is aware of VTE s/s as his wife had DVT and PE several years ago in setting of spinal surgery. Denies any questions or concerns.    INTERPRETATION:  Clinical history: Trauma  Multiple views of the right tibia and fibula are obtained  Proximal fibula fracture is seen which is obliquely oriented with minimal displacement. No additional fracture.  Impression: Proximal fibular fracture    BMI: 33.9    CrCl: 80.8    Incision Time:1452  Procedure End Time:1610    Caprini VTE Risk Score:  CAPRINI SCORE  AGE RELATED RISK FACTORS                                                       MOBILITY RELATED FACTORS  [ ] Age 41-60 years                                            (1 Point)                  [ ] Bed rest                                                        (1 Point)  [x ] Age: 61-74 years                                           (2 Points)                [ ] Plaster cast                                                   (2 Points)  [ ] Age= 75 years                                              (3 Points)                 [ ] Bed bound for more than 72 hours                   (2 Points)    DISEASE RELATED RISK FACTORS                                               GENDER SPECIFIC FACTORS  [ ] Edema in the lower extremities                       (1 Point)           [ ] Pregnancy                                                            (1 Point)  [ ] Varicose veins                                               (1 Point)                  [ ] Post-partum < 6 weeks                                      (1 Point)             [x ] BMI > 25 Kg/m2                                            (1 Point)                  [ ] Hormonal therapy or oral contraception       (1 Point)                 [ ] Sepsis (in the previous month)                        (1 Point)             [ ] History of pregnancy complications                (1Point)  [ ] Pneumonia or serious lung disease                                             [ ] Unexplained or recurrent  (=/>3), premature                                 (In the previous month)                               (1 Point)                birth with toxemia or growth-restricted infant (1 Point)  [ ] Abnormal pulmonary function test            (1 Point)                                   SURGERY RELATED RISK FACTORS  [ ] Acute myocardial infarction                       (1 Point)                  [ ]  Section                                         (1 Point)  [ ] Congestive heart failure (in the previous month) (1 Point)   [ ] Minor surgery   lasting <45 minutes       (1 Point)   [ ] Inflammatory bowel disease                             (1 Point)          [ ] Arthroscopic surgery                                  (2 Points)  [ ] Central venous access                                    (2 Points)            [ x] General surgery lasting >45 minutes      (2 Points)       [ ] Stroke (in the previous month)                  (5 Points)            [ ] Elective major lower extremity arthroplasty (5 Points)                                   [ x ] Malignancy (present or past include skin melanoma                                          but exclude  basal skin cell)    (2 points)                                      TRAUMA RELATED RISK FACTORS                HEMATOLOGY RELATED FACTORS                                  [ ] Fracture of the hip, pelvis, or leg                       (5 Points)  [ ] Prior episodes of VTE                                     (3 Points)          [ ] Acute spinal cord injury (in the previous month)  (5 Points)  [ ] Positive family history for VTE                         (3 Points)       [ ] Paralysis (less than 1 month)                          (5 Points)  [ ] Prothrombin 79664 A                                      (3 Points)         [ ] Multiple Trauma (within 1month)                 (5Points)                                                                                                                                                                [ ] Factor V Leiden                                          (3 Points)                                OTHER RISK FACTORS                          [ ] Lupus anticoagulants                                     (3 Points)                       [ ] BMI > 40                          (1 Point)                                                         [ ] Anticardiolipin antibodies                                (3 Points)                   [ ] Smoking                              (1Point)                                                [ ] High homocysteine in the blood                      (3 Points)                [ x ] Diabetes requiring insulin (1point)                         [ ] Other congenital or acquired thrombophilia       (3 Points)          [  ] Chemotherapy                   (1 Point)  [ ] Heparin induced thrombocytopenia                  (3 Points)             [  ] Blood Transfusion                (1 point)                                                                                                             Total Score [         8 ]                                                                                                                                                                                                                                                                                                                                                                                                                                       IMPROVE Bleeding Risk Score:2.5      Falls Risk:   High (  )  Mod (  )  Low (  )      FAMILY HISTORY:  FH: CHF (congestive heart failure)  mother    Family history of Alzheimer&#x27;s disease  mother      Denies any personal or familial history of clotting or bleeding disorders.    Allergies    No Known Allergies    Intolerances        REVIEW OF SYSTEMS    (  )Fever	        (  )Constipation	(  )SOB				  (  )Headache   (  )Dysuria  (  )Chills	        (  )Melena	        (  )Dyspnea on exertion (  )Dizziness    (  )Polyuria  (  )Nausea      (  )Hematochezia	(  )Cough                          (  )Syncope      (  )Hematuria  (  )Vomiting   (  )Chest Pain	        (  )Wheezing			  (  )Weakness  (  )Diarrhea    (  )Palpitations	(  )Anorexia			  (x  )Myalgia       (  x ) Arthralgia    Pertinent positives in HPI and daily subjective. All other systems negative.    Vital Signs Last 24 Hrs  T(C): 36.7 (22 Dec 2020 08:46), Max: 36.9 (21 Dec 2020 20:19)  T(F): 98.1 (22 Dec 2020 08:46), Max: 98.5 (21 Dec 2020 20:19)  HR: 66 (22 Dec 2020 08:46) (60 - 638)  BP: 135/52 (22 Dec 2020 08:46) (125/57 - 140/72)  BP(mean): --  RR: 18 (22 Dec 2020 08:46) (12 - 18)  SpO2: 99% (22 Dec 2020 08:46) (96% - 100%)      PHYSICAL EXAM:    Constitutional: Appears Well    Neurological: A& O x 3    Skin: Warm    Respiratory and Thorax: normal effort; Breath sounds: normal; No rales/wheezing/rhonchi  	  Cardiovascular: S1, S2, regular, NMBR	    Gastrointestinal: BS + x 4Q, nontender	    Genitourinary:  Bladder nondistended, nontender    Musculoskeletal:   General Right:   no muscle/joint tenderness,   normal tone, no joint swelling,   ROM: limited	    General Left:   no muscle/joint tenderness,   normal tone, no joint swelling,   ROM: full    RLE: immobilizer + ace + dressing CDI    Lower extrems:   Right: no calf tenderness              negative bridgett's sign               + pedal pulses    Left:   no calf tenderness              negative bridgett's sign               + pedal pulses                          13.1   10.79 )-----------( 347      ( 22 Dec 2020 06:46 )             39.1       12-22    139  |  107  |  23  ----------------------------<  229<H>  4.3   |  24  |  1.32<H>    Ca    8.7      22 Dec 2020 06:46        PT/INR - ( 21 Dec 2020 05:45 )   PT: 13.7 sec;   INR: 1.19 ratio         PTT - ( 21 Dec 2020 05:45 )  PTT:27.7 sec				    MEDICATIONS  (STANDING):  amLODIPine   Tablet 5 milliGRAM(s) Oral daily  ascorbic acid 500 milliGRAM(s) Oral two times a day  dextrose 40% Gel 15 Gram(s) Oral once  dextrose 40% Gel 15 Gram(s) Oral once  dextrose 5%. 1000 milliLiter(s) IV Continuous <Continuous>  dextrose 5%. 1000 milliLiter(s) IV Continuous <Continuous>  dextrose 5%. 1000 milliLiter(s) IV Continuous <Continuous>  dextrose 5%. 1000 milliLiter(s) IV Continuous <Continuous>  dextrose 50% Injectable 25 Gram(s) IV Push once  dextrose 50% Injectable 12.5 Gram(s) IV Push once  dextrose 50% Injectable 25 Gram(s) IV Push once  dextrose 50% Injectable 25 Gram(s) IV Push once  dextrose 50% Injectable 12.5 Gram(s) IV Push once  dextrose 50% Injectable 25 Gram(s) IV Push once  enoxaparin Injectable 40 milliGRAM(s) SubCutaneous daily  famotidine    Tablet 20 milliGRAM(s) Oral every 12 hours  folic acid 1 milliGRAM(s) Oral daily  glucagon  Injectable 1 milliGRAM(s) IntraMuscular once  glucagon  Injectable 1 milliGRAM(s) IntraMuscular once  insulin lispro (ADMELOG) corrective regimen sliding scale   SubCutaneous three times a day before meals  insulin lispro (ADMELOG) corrective regimen sliding scale   SubCutaneous at bedtime  lactated ringers. 1000 milliLiter(s) IV Continuous <Continuous>  multivitamin 1 Tablet(s) Oral daily  sodium chloride 0.9%. 1000 milliLiter(s) IV Continuous <Continuous>            **Current DVT Prophylaxis:    LMWH                   (x  )  Heparin SQ           (  )  Coumadin             (  )  Xarelto                  (  )  Eliquis                   (  )  Venodynes           ( x )  Ambulation          (x  )  UFH                       (  )  ECASA                   (  )  Contraindicated  (  )

## 2020-12-22 NOTE — PHYSICAL THERAPY INITIAL EVALUATION ADULT - CRITERIA FOR SKILLED THERAPEUTIC INTERVENTIONS
impairments found/functional limitations in following categories/risk reduction/prevention
impairments found/functional limitations in following categories/risk reduction/prevention

## 2020-12-22 NOTE — PROGRESS NOTE ADULT - SUBJECTIVE AND OBJECTIVE BOX
Orthopedics      Patient seen and examined at bedside. Feeling well. Pain controlled. No n/v. No acute events overnight.    Vital Signs Last 24 Hrs  T(C): 36.4 (12-22-20 @ 05:13), Max: 36.9 (12-21-20 @ 20:19)  T(F): 97.5 (12-22-20 @ 05:13), Max: 98.5 (12-21-20 @ 20:19)  HR: 64 (12-22-20 @ 05:13) (57 - 638)  BP: 125/57 (12-22-20 @ 05:13) (125/57 - 140/72)  BP(mean): --  RR: 18 (12-22-20 @ 05:13) (12 - 18)  SpO2: 96% (12-22-20 @ 05:13) (96% - 100%)                        13.5   12.68 )-----------( 359      ( 21 Dec 2020 17:26 )             41.7     21 Dec 2020 17:26    139    |  107    |  18     ----------------------------<  158    4.4     |  26     |  1.20     Ca    8.7        21 Dec 2020 17:26      PT/INR - ( 21 Dec 2020 05:45 )   PT: 13.7 sec;   INR: 1.19 ratio         PTT - ( 21 Dec 2020 05:45 )  PTT:27.7 sec    Exam:  Gen: NAD, resting comfortably  RLE:  Dressing c/d/i in BJKI  NTTP calves b/l  +EHL/FHL/TA/GS  SILT L2-S1  Compartments soft and compressible  2+ Pulses palpable      A/P: 65yM POD 1 s/p R quad tendon repair  -FU AM labs  -Pain control  -NWB RLE  -DVT ppx  -Incentive Spirometry  -Elevation to extremity  -Medical management appreciated  -DC home today

## 2020-12-22 NOTE — PHYSICAL THERAPY INITIAL EVALUATION ADULT - GENERAL OBSERVATIONS, REHAB EVAL
The pt was pleasant and cooperative, received on 2N, supine and eager to participate in PT evaluation. 1 Knee Immobilizer donned, 1 IV in place.
The pt was pleasant and cooperative, received on 2N, supine and eager to participate in PT evaluation. 1 Knee Immobilizer donned, 1 IV in place.

## 2020-12-28 DIAGNOSIS — S82.831A OTHER FRACTURE OF UPPER AND LOWER END OF RIGHT FIBULA, INITIAL ENCOUNTER FOR CLOSED FRACTURE: ICD-10-CM

## 2020-12-28 DIAGNOSIS — E78.5 HYPERLIPIDEMIA, UNSPECIFIED: ICD-10-CM

## 2020-12-28 DIAGNOSIS — Z79.4 LONG TERM (CURRENT) USE OF INSULIN: ICD-10-CM

## 2020-12-28 DIAGNOSIS — I10 ESSENTIAL (PRIMARY) HYPERTENSION: ICD-10-CM
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
## 2020-12-28 DIAGNOSIS — Z81.8 FAMILY HISTORY OF OTHER MENTAL AND BEHAVIORAL DISORDERS: ICD-10-CM

## 2020-12-28 DIAGNOSIS — Z90.49 ACQUIRED ABSENCE OF OTHER SPECIFIED PARTS OF DIGESTIVE TRACT: ICD-10-CM
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
## 2020-12-28 DIAGNOSIS — S76.111A STRAIN OF RIGHT QUADRICEPS MUSCLE, FASCIA AND TENDON, INITIAL ENCOUNTER: ICD-10-CM

## 2020-12-28 DIAGNOSIS — Z82.49 FAMILY HISTORY OF ISCHEMIC HEART DISEASE AND OTHER DISEASES OF THE CIRCULATORY SYSTEM: ICD-10-CM

## 2020-12-28 DIAGNOSIS — Z85.820 PERSONAL HISTORY OF MALIGNANT MELANOMA OF SKIN: ICD-10-CM

## 2020-12-28 DIAGNOSIS — S83.421A SPRAIN OF LATERAL COLLATERAL LIGAMENT OF RIGHT KNEE, INITIAL ENCOUNTER: ICD-10-CM

## 2020-12-28 DIAGNOSIS — M19.90 UNSPECIFIED OSTEOARTHRITIS, UNSPECIFIED SITE: ICD-10-CM

## 2020-12-28 DIAGNOSIS — E11.9 TYPE 2 DIABETES MELLITUS WITHOUT COMPLICATIONS: ICD-10-CM

## 2020-12-28 DIAGNOSIS — S83.411A SPRAIN OF MEDIAL COLLATERAL LIGAMENT OF RIGHT KNEE, INITIAL ENCOUNTER: ICD-10-CM

## 2021-03-31 ENCOUNTER — TRANSCRIPTION ENCOUNTER (OUTPATIENT)
Age: 66
End: 2021-03-31

## 2021-03-31 ENCOUNTER — OUTPATIENT (OUTPATIENT)
Dept: OUTPATIENT SERVICES | Facility: HOSPITAL | Age: 66
LOS: 1 days | End: 2021-03-31
Payer: OTHER MISCELLANEOUS

## 2021-03-31 VITALS
HEART RATE: 84 BPM | TEMPERATURE: 98 F | SYSTOLIC BLOOD PRESSURE: 101 MMHG | DIASTOLIC BLOOD PRESSURE: 68 MMHG | RESPIRATION RATE: 18 BRPM | WEIGHT: 214.95 LBS | OXYGEN SATURATION: 99 % | HEIGHT: 69 IN

## 2021-03-31 DIAGNOSIS — S76.119A STRAIN OF UNSPECIFIED QUADRICEPS MUSCLE, FASCIA AND TENDON, INITIAL ENCOUNTER: Chronic | ICD-10-CM

## 2021-03-31 DIAGNOSIS — Z98.890 OTHER SPECIFIED POSTPROCEDURAL STATES: Chronic | ICD-10-CM

## 2021-03-31 DIAGNOSIS — S76.111D STRAIN OF RIGHT QUADRICEPS MUSCLE, FASCIA AND TENDON, SUBSEQUENT ENCOUNTER: ICD-10-CM

## 2021-03-31 DIAGNOSIS — Z01.818 ENCOUNTER FOR OTHER PREPROCEDURAL EXAMINATION: ICD-10-CM

## 2021-03-31 DIAGNOSIS — Z90.49 ACQUIRED ABSENCE OF OTHER SPECIFIED PARTS OF DIGESTIVE TRACT: Chronic | ICD-10-CM

## 2021-03-31 LAB
ANION GAP SERPL CALC-SCNC: 6 MMOL/L — SIGNIFICANT CHANGE UP (ref 5–17)
APTT BLD: 30.4 SEC — SIGNIFICANT CHANGE UP (ref 27.5–35.5)
BASOPHILS # BLD AUTO: 0.08 K/UL — SIGNIFICANT CHANGE UP (ref 0–0.2)
BASOPHILS NFR BLD AUTO: 0.8 % — SIGNIFICANT CHANGE UP (ref 0–2)
BUN SERPL-MCNC: 25 MG/DL — HIGH (ref 7–23)
CALCIUM SERPL-MCNC: 10.1 MG/DL — SIGNIFICANT CHANGE UP (ref 8.5–10.1)
CHLORIDE SERPL-SCNC: 103 MMOL/L — SIGNIFICANT CHANGE UP (ref 96–108)
CO2 SERPL-SCNC: 28 MMOL/L — SIGNIFICANT CHANGE UP (ref 22–31)
CREAT SERPL-MCNC: 1.41 MG/DL — HIGH (ref 0.5–1.3)
EOSINOPHIL # BLD AUTO: 0.2 K/UL — SIGNIFICANT CHANGE UP (ref 0–0.5)
EOSINOPHIL NFR BLD AUTO: 2 % — SIGNIFICANT CHANGE UP (ref 0–6)
GLUCOSE SERPL-MCNC: 92 MG/DL — SIGNIFICANT CHANGE UP (ref 70–99)
HCT VFR BLD CALC: 46.1 % — SIGNIFICANT CHANGE UP (ref 39–50)
HGB BLD-MCNC: 15.3 G/DL — SIGNIFICANT CHANGE UP (ref 13–17)
IMM GRANULOCYTES NFR BLD AUTO: 0.3 % — SIGNIFICANT CHANGE UP (ref 0–1.5)
INR BLD: 1.08 RATIO — SIGNIFICANT CHANGE UP (ref 0.88–1.16)
LYMPHOCYTES # BLD AUTO: 2.66 K/UL — SIGNIFICANT CHANGE UP (ref 1–3.3)
LYMPHOCYTES # BLD AUTO: 26.9 % — SIGNIFICANT CHANGE UP (ref 13–44)
MCHC RBC-ENTMCNC: 32.5 PG — SIGNIFICANT CHANGE UP (ref 27–34)
MCHC RBC-ENTMCNC: 33.2 GM/DL — SIGNIFICANT CHANGE UP (ref 32–36)
MCV RBC AUTO: 97.9 FL — SIGNIFICANT CHANGE UP (ref 80–100)
MONOCYTES # BLD AUTO: 0.62 K/UL — SIGNIFICANT CHANGE UP (ref 0–0.9)
MONOCYTES NFR BLD AUTO: 6.3 % — SIGNIFICANT CHANGE UP (ref 2–14)
NEUTROPHILS # BLD AUTO: 6.31 K/UL — SIGNIFICANT CHANGE UP (ref 1.8–7.4)
NEUTROPHILS NFR BLD AUTO: 63.7 % — SIGNIFICANT CHANGE UP (ref 43–77)
PLATELET # BLD AUTO: 395 K/UL — SIGNIFICANT CHANGE UP (ref 150–400)
POTASSIUM SERPL-MCNC: 4.5 MMOL/L — SIGNIFICANT CHANGE UP (ref 3.5–5.3)
POTASSIUM SERPL-SCNC: 4.5 MMOL/L — SIGNIFICANT CHANGE UP (ref 3.5–5.3)
PROTHROM AB SERPL-ACNC: 12.6 SEC — SIGNIFICANT CHANGE UP (ref 10.6–13.6)
RBC # BLD: 4.71 M/UL — SIGNIFICANT CHANGE UP (ref 4.2–5.8)
RBC # FLD: 13.3 % — SIGNIFICANT CHANGE UP (ref 10.3–14.5)
SODIUM SERPL-SCNC: 137 MMOL/L — SIGNIFICANT CHANGE UP (ref 135–145)
WBC # BLD: 9.9 K/UL — SIGNIFICANT CHANGE UP (ref 3.8–10.5)
WBC # FLD AUTO: 9.9 K/UL — SIGNIFICANT CHANGE UP (ref 3.8–10.5)

## 2021-03-31 PROCEDURE — 85730 THROMBOPLASTIN TIME PARTIAL: CPT

## 2021-03-31 PROCEDURE — 83036 HEMOGLOBIN GLYCOSYLATED A1C: CPT

## 2021-03-31 PROCEDURE — 36415 COLL VENOUS BLD VENIPUNCTURE: CPT

## 2021-03-31 PROCEDURE — 80048 BASIC METABOLIC PNL TOTAL CA: CPT

## 2021-03-31 PROCEDURE — G0463: CPT | Mod: 25

## 2021-03-31 PROCEDURE — 85610 PROTHROMBIN TIME: CPT

## 2021-03-31 PROCEDURE — 85025 COMPLETE CBC W/AUTO DIFF WBC: CPT

## 2021-03-31 RX ORDER — INSULIN NPH HUM/REG INSULIN HM 70-30/ML
45 VIAL (ML) SUBCUTANEOUS
Qty: 0 | Refills: 0 | DISCHARGE

## 2021-03-31 RX ORDER — ATORVASTATIN CALCIUM 80 MG/1
5 TABLET, FILM COATED ORAL
Qty: 0 | Refills: 0 | DISCHARGE

## 2021-03-31 RX ORDER — AMOXICILLIN 250 MG/5ML
1 SUSPENSION, RECONSTITUTED, ORAL (ML) ORAL
Qty: 0 | Refills: 0 | DISCHARGE

## 2021-03-31 NOTE — H&P PST ADULT -  CURRENT SWALLOWING
Patient no show his appointment today    Last Rx refill-----08/02/19  Last office visit--04/23/19  Next office visit--09/03/19      ----- Message from Tre Vela sent at 9/3/2019  2:50 PM CDT -----  Contact: Pt  Rx Refill/Request     Is this a Refill or New Rx:  Refill    Rx Name and Strength:  oxyCODONE-acetaminophen (PERCOCET)  mg per tablet    Preferred Pharmacy with phone number: [id number] [facility] Phone #[phone]    Communication Preference:[phone]    Additional Information: The Pt thought that her appt was on 9/18/19.    
(0) swallows foods and liquids w/o difficulty

## 2021-03-31 NOTE — H&P PST ADULT - ASSESSMENT
65 y.o male scheduled for  65 y.o male scheduled for  Right knee quadriceps tendon rupture repair   Plan  1. Stop all NSAIDS, herbal supplements and vitamins for 7 days.  2. NPO at midnight.  3. Take the following medications Amlodipine, Valsartan  with small sips of water on the morning of your procedure/surgery.  4. Use EZ sponges as directed  5. Labs, EKG as per surgeon  6. PMD Dr Ksenia Vazquez, & cardiology Dr Barton  visit for optimization prior to surgery as per surgeon  7. COVID swab appt: 4/2/2021    Denies travel outside of state or country in the last 14 days.   Denies contact with known Coronavirus positive person.  Denies fever, chills, cough, congestion, runny nose, SOB or difficulty breathing, fatigue, muscle or body ache, headache. loss of taste or smell, N/V/D.

## 2021-03-31 NOTE — H&P PST ADULT - NSICDXPASTSURGICALHX_GEN_ALL_CORE_FT
PAST SURGICAL HISTORY:  Quadriceps tendon rupture Repair with repair patellar ligaments--12/28/2020    S/P Achilles tendon repair left--2007    S/P cholecystectomy open--1980    S/P laminectomy lumbar--2010

## 2021-03-31 NOTE — H&P PST ADULT - HISTORY OF PRESENT ILLNESS
65 y.o male  65 y.o male ambulating with cane presents for PST with hx of right ruptured quadriceps tendon. Patient states he ruptured his right quadriceps tendon 12/3/2020 and had surgery 12/28/2020. Patient states he was in the shower in his shower chair when he got up and felt a "pop" in his leg. Patient developed severe pain and followed with ortho. He states diagnostics revealed tear in quadriceps. He now requires surgery, scheduled for Right knee quadriceps tendon rupture repair

## 2021-03-31 NOTE — H&P PST ADULT - MUSCULOSKELETAL COMMENTS
See HPI Right knee site clear, well healed scar, immobilizer in place, lower anterior thigh deformity

## 2021-04-01 DIAGNOSIS — Z01.818 ENCOUNTER FOR OTHER PREPROCEDURAL EXAMINATION: ICD-10-CM

## 2021-04-01 DIAGNOSIS — S76.111D STRAIN OF RIGHT QUADRICEPS MUSCLE, FASCIA AND TENDON, SUBSEQUENT ENCOUNTER: ICD-10-CM

## 2021-04-01 LAB
A1C WITH ESTIMATED AVERAGE GLUCOSE RESULT: 7.1 % — HIGH (ref 4–5.6)
ESTIMATED AVERAGE GLUCOSE: 157 MG/DL — HIGH (ref 68–114)

## 2021-04-02 ENCOUNTER — APPOINTMENT (OUTPATIENT)
Dept: DISASTER EMERGENCY | Facility: CLINIC | Age: 66
End: 2021-04-02

## 2021-04-02 DIAGNOSIS — Z01.818 ENCOUNTER FOR OTHER PREPROCEDURAL EXAMINATION: ICD-10-CM

## 2021-04-02 PROBLEM — Z00.00 ENCOUNTER FOR PREVENTIVE HEALTH EXAMINATION: Status: ACTIVE | Noted: 2021-04-02

## 2021-04-03 LAB — SARS-COV-2 N GENE NPH QL NAA+PROBE: NOT DETECTED

## 2021-04-05 ENCOUNTER — OUTPATIENT (OUTPATIENT)
Dept: INPATIENT UNIT | Facility: HOSPITAL | Age: 66
LOS: 1 days | Discharge: HOME | End: 2021-04-05
Payer: OTHER MISCELLANEOUS

## 2021-04-05 VITALS
SYSTOLIC BLOOD PRESSURE: 120 MMHG | RESPIRATION RATE: 16 BRPM | HEIGHT: 69 IN | TEMPERATURE: 98 F | DIASTOLIC BLOOD PRESSURE: 75 MMHG | WEIGHT: 214.95 LBS | OXYGEN SATURATION: 98 % | HEART RATE: 66 BPM

## 2021-04-05 DIAGNOSIS — S76.119A STRAIN OF UNSPECIFIED QUADRICEPS MUSCLE, FASCIA AND TENDON, INITIAL ENCOUNTER: Chronic | ICD-10-CM

## 2021-04-05 DIAGNOSIS — E78.5 HYPERLIPIDEMIA, UNSPECIFIED: ICD-10-CM

## 2021-04-05 DIAGNOSIS — I34.8 OTHER NONRHEUMATIC MITRAL VALVE DISORDERS: ICD-10-CM

## 2021-04-05 DIAGNOSIS — S76.111D STRAIN OF RIGHT QUADRICEPS MUSCLE, FASCIA AND TENDON, SUBSEQUENT ENCOUNTER: ICD-10-CM

## 2021-04-05 DIAGNOSIS — I10 ESSENTIAL (PRIMARY) HYPERTENSION: ICD-10-CM

## 2021-04-05 DIAGNOSIS — Z98.890 OTHER SPECIFIED POSTPROCEDURAL STATES: Chronic | ICD-10-CM

## 2021-04-05 DIAGNOSIS — S76.111A STRAIN OF RIGHT QUADRICEPS MUSCLE, FASCIA AND TENDON, INITIAL ENCOUNTER: ICD-10-CM

## 2021-04-05 DIAGNOSIS — Z90.49 ACQUIRED ABSENCE OF OTHER SPECIFIED PARTS OF DIGESTIVE TRACT: Chronic | ICD-10-CM

## 2021-04-05 DIAGNOSIS — Z79.4 LONG TERM (CURRENT) USE OF INSULIN: ICD-10-CM

## 2021-04-05 DIAGNOSIS — Z79.82 LONG TERM (CURRENT) USE OF ASPIRIN: ICD-10-CM

## 2021-04-05 DIAGNOSIS — E11.9 TYPE 2 DIABETES MELLITUS WITHOUT COMPLICATIONS: ICD-10-CM
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
## 2021-04-05 PROCEDURE — 97116 GAIT TRAINING THERAPY: CPT | Mod: GP

## 2021-04-05 PROCEDURE — 82962 GLUCOSE BLOOD TEST: CPT

## 2021-04-05 PROCEDURE — 97530 THERAPEUTIC ACTIVITIES: CPT | Mod: GP

## 2021-04-05 PROCEDURE — 36415 COLL VENOUS BLD VENIPUNCTURE: CPT

## 2021-04-05 PROCEDURE — 80048 BASIC METABOLIC PNL TOTAL CA: CPT

## 2021-04-05 PROCEDURE — 97162 PT EVAL MOD COMPLEX 30 MIN: CPT | Mod: GP

## 2021-04-05 PROCEDURE — 85027 COMPLETE CBC AUTOMATED: CPT

## 2021-04-05 RX ORDER — ATORVASTATIN CALCIUM 80 MG/1
1 TABLET, FILM COATED ORAL
Qty: 0 | Refills: 0 | DISCHARGE

## 2021-04-05 RX ORDER — SODIUM CHLORIDE 9 MG/ML
1000 INJECTION, SOLUTION INTRAVENOUS
Refills: 0 | Status: DISCONTINUED | OUTPATIENT
Start: 2021-04-05 | End: 2021-04-06

## 2021-04-05 RX ORDER — SODIUM CHLORIDE 9 MG/ML
1000 INJECTION, SOLUTION INTRAVENOUS
Refills: 0 | Status: DISCONTINUED | OUTPATIENT
Start: 2021-04-05 | End: 2021-04-05

## 2021-04-05 RX ORDER — INSULIN LISPRO 100/ML
VIAL (ML) SUBCUTANEOUS AT BEDTIME
Refills: 0 | Status: DISCONTINUED | OUTPATIENT
Start: 2021-04-05 | End: 2021-04-06

## 2021-04-05 RX ORDER — DEXTROSE 50 % IN WATER 50 %
25 SYRINGE (ML) INTRAVENOUS ONCE
Refills: 0 | Status: DISCONTINUED | OUTPATIENT
Start: 2021-04-05 | End: 2021-04-06

## 2021-04-05 RX ORDER — ASPIRIN/CALCIUM CARB/MAGNESIUM 324 MG
0 TABLET ORAL
Qty: 0 | Refills: 0 | DISCHARGE

## 2021-04-05 RX ORDER — FENTANYL CITRATE 50 UG/ML
50 INJECTION INTRAVENOUS
Refills: 0 | Status: DISCONTINUED | OUTPATIENT
Start: 2021-04-05 | End: 2021-04-05

## 2021-04-05 RX ORDER — DEXTROSE 50 % IN WATER 50 %
12.5 SYRINGE (ML) INTRAVENOUS ONCE
Refills: 0 | Status: DISCONTINUED | OUTPATIENT
Start: 2021-04-05 | End: 2021-04-06

## 2021-04-05 RX ORDER — LANOLIN ALCOHOL/MO/W.PET/CERES
3 CREAM (GRAM) TOPICAL AT BEDTIME
Refills: 0 | Status: DISCONTINUED | OUTPATIENT
Start: 2021-04-05 | End: 2021-04-06

## 2021-04-05 RX ORDER — GLUCAGON INJECTION, SOLUTION 0.5 MG/.1ML
1 INJECTION, SOLUTION SUBCUTANEOUS ONCE
Refills: 0 | Status: DISCONTINUED | OUTPATIENT
Start: 2021-04-05 | End: 2021-04-06

## 2021-04-05 RX ORDER — CEFAZOLIN SODIUM 1 G
2000 VIAL (EA) INJECTION EVERY 8 HOURS
Refills: 0 | Status: COMPLETED | OUTPATIENT
Start: 2021-04-05 | End: 2021-04-06

## 2021-04-05 RX ORDER — INSULIN LISPRO 100/ML
VIAL (ML) SUBCUTANEOUS
Refills: 0 | Status: DISCONTINUED | OUTPATIENT
Start: 2021-04-05 | End: 2021-04-06

## 2021-04-05 RX ORDER — BENZOCAINE AND MENTHOL 5; 1 G/100ML; G/100ML
1 LIQUID ORAL AT BEDTIME
Refills: 0 | Status: DISCONTINUED | OUTPATIENT
Start: 2021-04-05 | End: 2021-04-06

## 2021-04-05 RX ORDER — DOCUSATE SODIUM 100 MG
1 CAPSULE ORAL
Qty: 21 | Refills: 0
Start: 2021-04-05 | End: 2021-04-11

## 2021-04-05 RX ORDER — ASPIRIN/CALCIUM CARB/MAGNESIUM 324 MG
325 TABLET ORAL
Refills: 0 | Status: DISCONTINUED | OUTPATIENT
Start: 2021-04-06 | End: 2021-04-06

## 2021-04-05 RX ORDER — HYDROMORPHONE HYDROCHLORIDE 2 MG/ML
0.5 INJECTION INTRAMUSCULAR; INTRAVENOUS; SUBCUTANEOUS
Refills: 0 | Status: DISCONTINUED | OUTPATIENT
Start: 2021-04-05 | End: 2021-04-05

## 2021-04-05 RX ORDER — ATORVASTATIN CALCIUM 80 MG/1
20 TABLET, FILM COATED ORAL AT BEDTIME
Refills: 0 | Status: DISCONTINUED | OUTPATIENT
Start: 2021-04-05 | End: 2021-04-06

## 2021-04-05 RX ORDER — OXYCODONE HYDROCHLORIDE 5 MG/1
5 TABLET ORAL EVERY 4 HOURS
Refills: 0 | Status: DISCONTINUED | OUTPATIENT
Start: 2021-04-05 | End: 2021-04-06

## 2021-04-05 RX ORDER — AMLODIPINE BESYLATE 2.5 MG/1
1 TABLET ORAL
Qty: 0 | Refills: 0 | DISCHARGE

## 2021-04-05 RX ORDER — OXYCODONE HYDROCHLORIDE 5 MG/1
5 TABLET ORAL ONCE
Refills: 0 | Status: DISCONTINUED | OUTPATIENT
Start: 2021-04-05 | End: 2021-04-05

## 2021-04-05 RX ORDER — DEXTROSE 50 % IN WATER 50 %
15 SYRINGE (ML) INTRAVENOUS ONCE
Refills: 0 | Status: DISCONTINUED | OUTPATIENT
Start: 2021-04-05 | End: 2021-04-06

## 2021-04-05 RX ORDER — ONDANSETRON 8 MG/1
4 TABLET, FILM COATED ORAL ONCE
Refills: 0 | Status: DISCONTINUED | OUTPATIENT
Start: 2021-04-05 | End: 2021-04-05

## 2021-04-05 RX ORDER — OXYCODONE HYDROCHLORIDE 5 MG/1
2.5 TABLET ORAL EVERY 4 HOURS
Refills: 0 | Status: DISCONTINUED | OUTPATIENT
Start: 2021-04-05 | End: 2021-04-06

## 2021-04-05 RX ORDER — TRAMADOL HYDROCHLORIDE 50 MG/1
50 TABLET ORAL EVERY 6 HOURS
Refills: 0 | Status: DISCONTINUED | OUTPATIENT
Start: 2021-04-05 | End: 2021-04-06

## 2021-04-05 RX ORDER — OXYCODONE HYDROCHLORIDE 5 MG/1
1 TABLET ORAL
Qty: 20 | Refills: 0
Start: 2021-04-05 | End: 2021-04-09

## 2021-04-05 RX ORDER — AMLODIPINE BESYLATE 2.5 MG/1
5 TABLET ORAL DAILY
Refills: 0 | Status: DISCONTINUED | OUTPATIENT
Start: 2021-04-05 | End: 2021-04-06

## 2021-04-05 RX ORDER — METFORMIN HYDROCHLORIDE 850 MG/1
1 TABLET ORAL
Qty: 0 | Refills: 0 | DISCHARGE

## 2021-04-05 RX ADMIN — ATORVASTATIN CALCIUM 20 MILLIGRAM(S): 80 TABLET, FILM COATED ORAL at 22:48

## 2021-04-05 RX ADMIN — SODIUM CHLORIDE 125 MILLILITER(S): 9 INJECTION, SOLUTION INTRAVENOUS at 19:37

## 2021-04-05 NOTE — ASU DISCHARGE PLAN (ADULT/PEDIATRIC) - CARE PROVIDER_API CALL
Russ Miller (DO)  Orthopaedic Surgery  [address] [place] [address]  Phone: [phone]  Fax: [phone]  Follow Up Time:

## 2021-04-05 NOTE — ASU DISCHARGE PLAN (ADULT/PEDIATRIC) - ASU DC SPECIAL INSTRUCTIONSFT
1.	Pain Control  2.	Walking with full weight bearing as tolerated, with assistive devices (walker/Cane as Needed) in the Knee Immobilizer at all times  3.	DVT Prophylaxis for 30 days  4.	PT as needed  5.	Follow up with Dr. Miller as Outpatient in 10-14 Days after Discharge from the Hospital or Rehab. Call Office For Appointment.  6.	Remove Staples Post-Op Day 14, and Remove Dressing Post-Op Day 10, with Daily Dressing Changes as Need.  7.	Ice affected area as Needed  8.	Keep Dressing  Clean and dry.

## 2021-04-05 NOTE — PROGRESS NOTE ADULT - ASSESSMENT
S/P right quadriceps tendon repair    Plan:  PT/NWBA RLE with crutches  Pain managements  DVT prophylaxis  Elevation and ice Rt knee  NV and compartments check RLE  Incentive spirometry  May discharge home tomorrow and follow as outpatient if cleared by PT.
S/P revision of right quadriceps tendon repair    Plan:  PT/TTWBA RLE with crutches  Pain managements  DVT prophylaxis  Elevation and ice Rt knee  NV and compartments check RLE  Incentive spirometry  May discharge home tomorrow and follow as outpatient if cleared by PT.

## 2021-04-05 NOTE — ASU PATIENT PROFILE, ADULT - PSH
Quadriceps tendon rupture  Repair with repair patellar ligaments--12/28/2020  S/P Achilles tendon repair  left--2007  S/P cholecystectomy  open--1980  S/P laminectomy  lumbar--2010

## 2021-04-06 ENCOUNTER — TRANSCRIPTION ENCOUNTER (OUTPATIENT)
Age: 66
End: 2021-04-06

## 2021-04-06 VITALS
SYSTOLIC BLOOD PRESSURE: 116 MMHG | TEMPERATURE: 98 F | HEART RATE: 73 BPM | OXYGEN SATURATION: 99 % | DIASTOLIC BLOOD PRESSURE: 61 MMHG | RESPIRATION RATE: 17 BRPM

## 2021-04-06 LAB
ANION GAP SERPL CALC-SCNC: 6 MMOL/L — SIGNIFICANT CHANGE UP (ref 5–17)
BUN SERPL-MCNC: 15 MG/DL — SIGNIFICANT CHANGE UP (ref 7–23)
CALCIUM SERPL-MCNC: 8.8 MG/DL — SIGNIFICANT CHANGE UP (ref 8.5–10.1)
CHLORIDE SERPL-SCNC: 105 MMOL/L — SIGNIFICANT CHANGE UP (ref 96–108)
CO2 SERPL-SCNC: 24 MMOL/L — SIGNIFICANT CHANGE UP (ref 22–31)
CREAT SERPL-MCNC: 1.26 MG/DL — SIGNIFICANT CHANGE UP (ref 0.5–1.3)
GLUCOSE SERPL-MCNC: 188 MG/DL — HIGH (ref 70–99)
HCT VFR BLD CALC: 39.5 % — SIGNIFICANT CHANGE UP (ref 39–50)
HGB BLD-MCNC: 13.7 G/DL — SIGNIFICANT CHANGE UP (ref 13–17)
MCHC RBC-ENTMCNC: 33.4 PG — SIGNIFICANT CHANGE UP (ref 27–34)
MCHC RBC-ENTMCNC: 34.7 GM/DL — SIGNIFICANT CHANGE UP (ref 32–36)
MCV RBC AUTO: 96.3 FL — SIGNIFICANT CHANGE UP (ref 80–100)
PLATELET # BLD AUTO: 330 K/UL — SIGNIFICANT CHANGE UP (ref 150–400)
POTASSIUM SERPL-MCNC: 4.3 MMOL/L — SIGNIFICANT CHANGE UP (ref 3.5–5.3)
POTASSIUM SERPL-SCNC: 4.3 MMOL/L — SIGNIFICANT CHANGE UP (ref 3.5–5.3)
RBC # BLD: 4.1 M/UL — LOW (ref 4.2–5.8)
RBC # FLD: 13.2 % — SIGNIFICANT CHANGE UP (ref 10.3–14.5)
SODIUM SERPL-SCNC: 135 MMOL/L — SIGNIFICANT CHANGE UP (ref 135–145)
WBC # BLD: 9.38 K/UL — SIGNIFICANT CHANGE UP (ref 3.8–10.5)
WBC # FLD AUTO: 9.38 K/UL — SIGNIFICANT CHANGE UP (ref 3.8–10.5)

## 2021-04-06 PROCEDURE — 99213 OFFICE O/P EST LOW 20 MIN: CPT

## 2021-04-06 RX ORDER — ENOXAPARIN SODIUM 100 MG/ML
40 INJECTION SUBCUTANEOUS DAILY
Refills: 0 | Status: DISCONTINUED | OUTPATIENT
Start: 2021-04-07 | End: 2021-04-06

## 2021-04-06 RX ORDER — ENOXAPARIN SODIUM 100 MG/ML
40 INJECTION SUBCUTANEOUS DAILY
Refills: 0 | Status: DISCONTINUED | OUTPATIENT
Start: 2021-04-06 | End: 2021-04-06

## 2021-04-06 RX ORDER — ENOXAPARIN SODIUM 100 MG/ML
40 INJECTION SUBCUTANEOUS
Qty: 40 | Refills: 0
Start: 2021-04-06 | End: 2021-05-05

## 2021-04-06 RX ORDER — ENOXAPARIN SODIUM 100 MG/ML
40 INJECTION SUBCUTANEOUS
Qty: 30 | Refills: 0
Start: 2021-04-06 | End: 2021-05-05

## 2021-04-06 RX ADMIN — Medication 2: at 12:30

## 2021-04-06 RX ADMIN — Medication 325 MILLIGRAM(S): at 09:39

## 2021-04-06 RX ADMIN — Medication 1: at 08:24

## 2021-04-06 RX ADMIN — ENOXAPARIN SODIUM 40 MILLIGRAM(S): 100 INJECTION SUBCUTANEOUS at 10:28

## 2021-04-06 RX ADMIN — Medication 100 MILLIGRAM(S): at 10:54

## 2021-04-06 RX ADMIN — AMLODIPINE BESYLATE 5 MILLIGRAM(S): 2.5 TABLET ORAL at 09:37

## 2021-04-06 RX ADMIN — Medication 100 MILLIGRAM(S): at 01:33

## 2021-04-06 NOTE — PHYSICAL THERAPY INITIAL EVALUATION ADULT - MANUAL MUSCLE TESTING RESULTS, REHAB EVAL
Right Ankle DF/PF 3/5, Right hip grossly 3+/5, SLR 3/5, left LE grossly 3+/5, bilateral UE grossly 3+/5/grossly assessed due to

## 2021-04-06 NOTE — PHYSICAL THERAPY INITIAL EVALUATION ADULT - AMBULATION SKILLS, REHAB EVAL
Axillary Crutches and RW available at home, prior to this injury the pt was ambulating without any AD./independent

## 2021-04-06 NOTE — DISCHARGE NOTE NURSING/CASE MANAGEMENT/SOCIAL WORK - PATIENT PORTAL LINK FT
You can access the FollowMyHealth Patient Portal offered by [facility] by registering at the following website: [email or web address]. By joining [facility]’s FollowMyHealth portal, you will also be able to view your health information using other applications (apps) compatible with our system.

## 2021-04-06 NOTE — PHYSICAL THERAPY INITIAL EVALUATION ADULT - CRITERIA FOR SKILLED THERAPEUTIC INTERVENTIONS
Home, f/u MD and eventual outpatient PT when medically appropriate./anticipated discharge recommendation

## 2021-04-06 NOTE — PHYSICAL THERAPY INITIAL EVALUATION ADULT - MODALITIES TREATMENT COMMENTS
The pt demonstrated independent transfers and ambulation ability at present today, the pt is eager to go home, the pt was left sitting OOB and in a chair with his right LE elevated on a chair and pillows. The pt was in NAD. The pt denied pain t/o tx. The pt demonstrated independence with TTWB. RN aware. CB, tray and phone in place. No need for continued skilled acute care PT services at present, the pt should ambulate under nursing supervision while at . Please re-consult PT PRN.

## 2021-04-06 NOTE — PHYSICAL THERAPY INITIAL EVALUATION ADULT - WEIGHT-BEARING RESTRICTIONS: SIT/STAND, REHAB EVAL
as per conversation with Ortho Resident - the pt is ordered WBAT but Dr. Landin note from 4/5/21 indicates TTWB Right LE, The ortho resident said he would reach out with Dr. Miller but to maintain TTWB as a conservative matter for the pt./toe touch weight-bearing

## 2021-04-06 NOTE — PHYSICAL THERAPY INITIAL EVALUATION ADULT - DID THE PATIENT HAVE SURGERY?
Mother Self Care:    Activity: Avoid strenuous exercise and get adequate rest.  No driving until your physician gives you consent.  Emotional Changes: The grieving process has many different stages, be prepared to experience lots of emotional ups and downs. Identify people to be your support system, and do not hesitate to call our  if you need someone to talk to.   Breast Care: You may notice milk leaking from your breasts. Wear a support bra 24 hours a day for one week or wrap breasts in an ace bandage if needed to stop milk production.  Avoid stimulation to breasts.  You may use ice packs for discomfort.  Janis-Care/Vaginal Bleeding: Remember to use your janis-bottle after urinating.  Your flow will change from red, to pink, to yellow/white color over a period of 2 weeks.  Menstruation will return in 3-8 weeks.  Episiotomy Vaginal Delivery: Stitches will dissolve within 10 days to 3 weeks.  Warm baths, tucks, and dermoplast spray will promote healing.  Avoid bubble baths or strong soaps.   Section/Tubal Ligation: Keep incision clean and dry.  Please remove steri-strips in 5-7 days.  You may shower, but avoid baths.  Sexual Activity/Pelvic Rest: No sexual activity, tampons, or douching until your physician gives you consent.  Diet: Continue to eat from the five basic food groups, including plenty of protein, fruits, vegetables, and whole grains.  Limit empty calories and high fat foods.  Drink enough fluids to satisfy thirst.  Constipation/Hemorrhoids: Drink plenty of water.  You may take a stool softener or natural laxative (Metamucil). You may use tucks or hemorrhoid ointment and soak in a warm tub.    CALL YOUR OB DOCTOR IF ANY OF THE FOLLOWING OCCURS:  *Heavy bleeding - saturating a pad an hour or passing any large (2-3 inches in size) blood clots.  *Any pain, redness, or tenderness in lower leg.  *You cannot care for yourself  *Any signs of infection-      - Temperature greater than 100.5  degrees F      - Foul smelling vaginal discharge and/or incisional drainage      - Increased episiotomy or incisional pain      - Hot, hard, red or sore area on breast      - Flu-like symptoms      - Any urgency, frequency or burning with urination     post op quad repair/yes

## 2021-04-06 NOTE — CONSULT NOTE ADULT - ASSESSMENT
65 y.o male  with hx of right ruptured quadriceps tendon. Patient states he ruptured his right quadriceps tendon 12/3/2020 and had surgery 12/28/2020. Presented again with  Right knee quadriceps tendon tear now s/p rupture repair. Consulted by     for VTE prophylaxis, risk stratification, and anticoagulation management. Patient is  high risk for VTE due to age, surgery, immobility, and BMI, low bleeding risk.    Plan  :Lovenox 40mg sq daily for four weeks post procedure  :daily cbc/bmp  :LE Venodynes  : increase mobility as tolerated  :Thanks for consult will f/u

## 2021-04-06 NOTE — PHYSICAL THERAPY INITIAL EVALUATION ADULT - PERTINENT HX OF CURRENT PROBLEM, REHAB EVAL
65 y.o male ambulating with cane presents for PST with hx of right ruptured quadriceps tendon. Patient states he ruptured his right quadriceps tendon 12/3/2020 and had surgery 12/28/2020. Patient states he was in the shower in his shower chair when he got up and felt a "pop" in his leg. Patient developed severe pain and followed with ortho. He states diagnostics revealed tear in quadriceps. He now requires surgery, scheduled for Right knee quadriceps tendon rupture repair

## 2021-04-06 NOTE — CONSULT NOTE ADULT - SUBJECTIVE AND OBJECTIVE BOX
HPI:      65 y.o male  with hx of right ruptured quadriceps tendon. Patient states he ruptured his right quadriceps tendon 12/3/2020 and had surgery 2020. Presented again with  Right knee quadriceps tendon tear now s/p rupture repair. Consulted by     for VTE prophylaxis, risk stratification, and anticoagulation management.    PAST MEDICAL & SURGICAL HISTORY:  DM (diabetes mellitus)    HTN (hypertension)    Hypercholesterolemia    Rupture quadriceps tendon  right    OA (osteoarthritis)    Lumbar spinal stenosis    S/P laminectomy  lumbar--    S/P Achilles tendon repair  left--    S/P cholecystectomy  open--    Quadriceps tendon rupture  Repair with repair patellar ligaments--2020    Interval History  2021: patient seen at bedside,Discussed lovenox and the risks and benefits with patient. patient stated "I took this with last surgery" Patient denies any h/o VTE, stroke or bleeding, also informed of the out patient labs. Verbalized understanding and is in agreement with treatment plan.        CrCl:78.8  BMI:31.7  EBL:5ml    Caprini VTE Risk Score:CAPRINI SCORE  AGE RELATED RISK FACTORS                                                       MOBILITY RELATED FACTORS  [ ] Age 41-60 years                                            (1 Point)                  [ ] Bed rest /restricted mobility                             (1 Point)  [ x] Age: 61-74 years                                           (2 Points)                [ ] Plaster cast                                                   (2 Points)  [ ] Age= 75 years                                              (3 Points)                 [ ] Bed bound for more than 72 hours                   (2 Points)    DISEASE RELATED RISK FACTORS                                               GENDER SPECIFIC FACTORS  [ ] Edema in the lower extremities                       (1 Point)           [ ] Pregnancy                                                            (1 Point)  [ ] Varicose veins                                               (1 Point)                  [ ] Post-partum < 6 weeks                                      (1 Point)             [x ] BMI > 25 Kg/m2                                            (1 Point)                  [ ] Hormonal therapy or oral contraception       (1 Point)                 [ ] Sepsis (in the previous month)                        (1 Point)             [ ] History of pregnancy complications                (1Point)  [ ] Pneumonia or serious lung disease                                             [ ] Unexplained or recurrent  (=/>3), premature                                 (In the previous month)                               (1 Point)                birth with toxemia or growth-restricted infant (1 Point)  [ ] Abnormal pulmonary function test            (1 Point)                                   SURGERY RELATED RISK FACTORS  [ ] Acute myocardial infarction                       (1 Point)                  [ ]  Section                                         (1 Point)  [ ] Congestive heart failure (in the previous month) (1 Point)   [ ] Minor surgery   lasting <45 minutes       (1 Point)   [ ] Inflammatory bowel disease                             (1 Point)          [ ] Arthroscopic surgery                                  (2 Points)  [ ] Central venous access                                    (2 Points)            [ ] General surgery lasting >45 minutes      (2 Points)       [ ] Stroke (in the previous month)                  (5 Points)            [ ] Elective major lower extremity arthroplasty (5 Points)                                   [  ] Malignancy (present or past include skin melanoma                                          but exclude  basal skin cell)    (2 points)                                      TRAUMA RELATED RISK FACTORS                HEMATOLOGY RELATED FACTORS                                  [ x] Fracture of the hip, pelvis, or leg                       (5 Points)  [ ] Prior episodes of VTE                                     (3 Points)          [ ] Acute spinal cord injury (in the previous month)  (5 Points)  [ ] Positive family history for VTE                         (3 Points)       [ ] Paralysis (less than 1 month)                          (5 Points)  [ ] Prothrombin 74010 A                                      (3 Points)         [ ] Multiple Trauma (within 1month)                 (5Points)                                                                                                                                                                [ ] Factor V Leiden                                          (3 Points)                                OTHER RISK FACTORS                          [ ] Lupus anticoagulants                                     (3 Points)                       [ ] BMI > 40                          (1 Point)                                                         [ ] Anticardiolipin antibodies                                (3 Points)                   [ ] Smoking                              (1Point)                                                [ ] High homocysteine in the blood                      (3 Points)                [ x ] Diabetes requiring insulin (1point)                         [ ] Other congenital or acquired thrombophilia       (3 Points)          [  ] Chemotherapy                   (1 Point)  [ ] Heparin induced thrombocytopenia                  (3 Points)             [  ] Blood Transfusion                (1 point)                                                                                                             Total Score [ 9         ]                                                                                                                                                                                                                                                                                                                                                                                                                                         IMPROVE Bleeding Risk Score:3.5        Time In: 17:53  Time Out: 19:28    Falls Risk:   High ( x )  Mod (  )  Low (  )      FAMILY HISTORY:  Family history of Alzheimer&#x27;s disease  mother, living , age 86    FH: CHF (congestive heart failure)  Father, , age 78      Denies any personal or familial history of clotting or bleeding disorders.    Allergies    No Known Allergies    Intolerances        REVIEW OF SYSTEMS    (  )Fever	     (  )Constipation	(  )SOB				(  )Headache	(  )Dysuria  (  )Chills	     (  )Melena	(  )Dyspnea present on exertion	                    (  )Dizziness                    (  )Polyuria  (  )Nausea	     (  )Hematochezia	(  )Cough			                    (  )Syncope   	(  )Hematuria  (  )Vomiting    (  )Chest Pain	(  )Wheezing			(  )Weakness  (  )Diarrhea     (  )Palpitations	(  )Anorexia			( x )joint pain    All  other review of systems negative: Yes    Vital Signs Last 24 Hrs  T(C): 36.4 (2021 08:42), Max: 36.7 (2021 13:56)  T(F): 97.6 (2021 08:42), Max: 98.1 (2021 13:56)  HR: 73 (2021 08:42) (66 - 93)  BP: 116/61 (2021 08:42) (116/61 - 144/68)  BP(mean): --  RR: 17 (2021 08:42) (12 - 18)  SpO2: 99% (2021 08:42) (96% - 100%)    PHYSICAL EXAM:    Constitutional: Appears Well    Neurological: A& O x 3    Skin: Warm    Respiratory and Thorax: normal effort; Breath sounds: normal; No rales/wheezing/rhonchi  	  Cardiovascular: S1, S2, regular, NMBR	    Gastrointestinal: BS + x 4Q, nontender	    Genitourinary:  Bladder nondistended, nontender    Musculoskeletal:   General Right:   + muscle/joint tenderness,   normal tone, no joint swelling,   ROM: limited	    General Left:   no muscle/joint tenderness,   normal tone, no joint swelling,   ROM: limited/full        LE:  Right: Incision: ; Dressing CDI; immob. noted; Cap refill good;  +; Sensation intact                      Lower extrems:   Right: no calf tenderness              negative bridgett's sign               + pedal pulses    Left:   no calf tenderness              negative bridgett's sign               + pedal pulses                          13.7   9.38  )-----------( 330      ( 2021 07:37 )             39.5       04-06    135  |  105  |  15  ----------------------------<  188<H>  4.3   |  24  |  1.26    Ca    8.8      2021 07:37        				    MEDICATIONS  (STANDING):  amLODIPine   Tablet 5 milliGRAM(s) Oral daily  atorvastatin 20 milliGRAM(s) Oral at bedtime  benzocaine 15 mG/menthol 3.6 mG (Sugar-Free) Lozenge 1 Lozenge Oral at bedtime  ceFAZolin   IVPB 2000 milliGRAM(s) IV Intermittent every 8 hours  dextrose 40% Gel 15 Gram(s) Oral once  dextrose 5%. 1000 milliLiter(s) IV Continuous <Continuous>  dextrose 5%. 1000 milliLiter(s) IV Continuous <Continuous>  dextrose 50% Injectable 25 Gram(s) IV Push once  dextrose 50% Injectable 12.5 Gram(s) IV Push once  dextrose 50% Injectable 25 Gram(s) IV Push once  enoxaparin Injectable 40 milliGRAM(s) SubCutaneous daily  glucagon  Injectable 1 milliGRAM(s) IntraMuscular once  insulin lispro (ADMELOG) corrective regimen sliding scale   SubCutaneous three times a day before meals  insulin lispro (ADMELOG) corrective regimen sliding scale   SubCutaneous at bedtime  melatonin 3 milliGRAM(s) Oral at bedtime          DVT Prophylaxis:  LMWH                   (  )  Heparin SQ           (  )  Coumadin             (  )  Xarelto                  (  )  Eliquis                   (  )  Venodynes           (  )  Ambulation          (  )  UFH                       (  )  Contraindicated  (  )  EC ASPIRIN       (  )

## 2021-04-06 NOTE — PROGRESS NOTE ADULT - SUBJECTIVE AND OBJECTIVE BOX
Patient tolerated the procedure well. Patient seen and examined at bedside.  No acute complaints at this time. Pain well controlled. Denies chest pain, shortness of breath, nausea or vomiting.     PE:  Vital Signs Last 24 Hrs  T(C): 36.5 (06 Apr 2021 05:34), Max: 36.7 (05 Apr 2021 13:56)  T(F): 97.7 (06 Apr 2021 05:34), Max: 98.1 (05 Apr 2021 13:56)  HR: 72 (06 Apr 2021 05:34) (66 - 93)  BP: 130/60 (06 Apr 2021 05:34) (119/63 - 144/68)  BP(mean): --  RR: 17 (06 Apr 2021 05:34) (12 - 18)  SpO2: 98% (06 Apr 2021 05:34) (96% - 100%)    General: NAD, resting comfortably in bed  RLE:   Dressing in place C/D/I, Knee immobilizer in place  SCD in place bilaterally  No calf tenderness   TA/EHL/FHL/GSC+  SILT L3-S1  DP+        A/P:  65y m s/p R Quadriceps repair POD 1    -23 Hour admit  -PT/OT -WBAT RLE in Knee immobilizer  -Knee immobilizer at all times  -Pain Control  -DVT ppx  BID, pending AC recs  -Continue perioperative abx x 24 hours  -FU AM Labs  -Rest, ice, compress and elevate the extremity as we needed  -Incentive Spirometry  -Medical management appreciated  -Home today after PT/AC  Ortho
Postop Check    Patient tolerated the procedure well. Patient seen and examined at bedside.  No acute complaints at this time. Pain well controlled. Denies chest pain, shortness of breath, nausea or vomiting.     PE:  Vital Signs Last 24 Hrs  T(C): 36.2 (04-05-21 @ 19:25), Max: 36.7 (04-05-21 @ 13:56)  T(F): 97.2 (04-05-21 @ 19:25), Max: 98.1 (04-05-21 @ 13:56)  HR: 88 (04-05-21 @ 20:00) (66 - 90)  BP: 119/63 (04-05-21 @ 20:00) (119/63 - 136/70)  BP(mean): --  RR: 12 (04-05-21 @ 20:00) (12 - 17)  SpO2: 99% (04-05-21 @ 20:00) (96% - 99%)    General: NAD, resting comfortably in bed  RLE:   Dressing in place C/D/I, Knee immobilizer in place  SCD in place bilaterally  No calf tenderness   TA/EHL/FHL/GSC+  SILT L3-S1  DP+        A/P:  65y m s/p R Quadriceps repair POD 0    -23 Hour admit  -PT/OT -WBAT RLE in Knee immobilizer  -Knee immobilizer at all times  -Pain Control  -DVT ppx  BID, pending AC recs  -Continue perioperative abx x 24 hours  -FU AM Labs  -Rest, ice, compress and elevate the extremity as we needed  -Incentive Spirometry  -Medical management appreciated    Ortho
patient seen and examines in PACU  Complaining of Rt knee pain    VS:  /66, puls 78, puls Ox 99%      PE:  + knee immobilizer on place  Dressing D/C/I  NVI RLE  Compartments soft B/L LE  FHL/EHL/TA/GS intact RLE    
patient seen and examines in PACU  Complaining of Rt knee pain    VS:  /72, puls 70. puls Ox 100%        PE:  + knee immobilizer on place  Dressing D/C/I  NVI RLE  Compartments soft B/L LE  FHL/EHL/TA/GS intact RLE

## 2021-04-06 NOTE — PHYSICAL THERAPY INITIAL EVALUATION ADULT - GENERAL OBSERVATIONS, REHAB EVAL
The pt was received on 2N, supine, pleasant and cooperative with RIght Knee Immobilizer in place, the pt is well known to this PT from the pt's previous admission. The pt is hopeful to go home ASAP.

## 2021-04-06 NOTE — PHYSICAL THERAPY INITIAL EVALUATION ADULT - PATIENT/FAMILY/SIGNIFICANT OTHER GOALS STATEMENT, PT EVAL
The pt reports that he was independent at home prior to this injury, he was regularly attending outpatient PT but has since stopped as per MD/Surgeon recommendation pre-op.

## 2021-04-13 ENCOUNTER — LABORATORY RESULT (OUTPATIENT)
Age: 66
End: 2021-04-13

## 2021-05-11 RX ORDER — ENOXAPARIN SODIUM 100 MG/ML
40 INJECTION SUBCUTANEOUS
Qty: 20 | Refills: 1
Start: 2021-05-11 | End: 2021-06-19

## 2021-11-08 NOTE — PRE-OP CHECKLIST - SKIN PREP
Acute Care - Physical Therapy Treatment Note  [facility]     Patient Name: Tawny Shin  : 1954  MRN: [id number]  Today's Date: 2021      Visit Dx:     ICD-10-CM ICD-9-CM   1. Impaired mobility  Z74.09 799.89   2. Impaired mobility and ADLs  Z74.09 V49.89    Z78.9    3. Dysphagia, unspecified type  R13.10 787.20     Patient Active Problem List   Diagnosis   • T12 compression fracture, initial encounter (Formerly [facility])   • Chronic embolism and thrombosis of unspecified deep veins of left lower extremity (Formerly [facility])   • Urinary tract infection without hematuria   • Acute bilateral thoracic back pain   • Chronic anticoagulation   • Hypokalemia   • Transaminitis   • Iron deficiency anemia   • Osteoporosis   • Bacteremia   • Moderate malnutrition (CMS/[facility]ly [facility])   • Epidural hematoma ([facility])     Past Medical History:   Diagnosis Date   • Age-related osteoporosis with current pathological fracture 2020   • Arthritis    • Asthma    • Bilateral bunions 2020   • Cancer ([facility]ly [facility])    • Cardiac pacemaker syndrome 2020    Overview:  - heart block - implanted    • Charcot's joint of foot, left 2020   • Chronic deep vein thrombosis (DVT) of right lower extremity ([facility]ly [facility]) 2021   • Chronic pain syndrome 2021   • Chronic sinusitis    • COPD (chronic obstructive pulmonary disease) ([facility])    • Coronary artery disease    • 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   • Elevated cholesterol    • Eustachian tube dysfunction    • Heart disease    • Herpes simplex    • History of transfusion    • Hyperlipidemia    • Hypertension    • Hypothyroidism 2020   • Intrinsic asthma 2020   • Knee dislocation    • Labral tear of right hip joint    • Laryngitis sicca    • Laryngitis, chronic    • Left carotid bruit 3/9/2016   • MI (myocardial infarction) ([facility])    • Myalgia due to statin 2019   • Open wound of right hip 2021   • Osteomyelitis of right femur ([facility]) 2021   • Otorrhea    • Pacemaker  "11/17/2016   • Primary osteoarthritis of left knee 12/23/2020   • Psoriasis vulgaris 12/23/2020   • S/P coronary artery stent placement 3/9/2016   • Sensorineural hearing loss    • Seropositive rheumatoid arthritis of multiple sites (HCC) 12/27/2019    Overview:  -myochrysine '93-'96 -methotrexate '96--->11/98;r/s  restarted 2/99--> 8/14 (anemia) -sulfasalazine- not effective -penicillamine 6/98-->10/98; no effect -leflunomide 11/98--> - Humira '13-->didn't take - Enbrel 12/14-->3/15- no effect!   Last Assessment & Plan:  - \"aching all over\" because she had to be off her anti-rheumatic drugs for 2 weeks in preparation for her R knee surgery - he   • Sick sinus syndrome ([facility]) 12/27/2019   • Sjogren's disease ([facility])    • Spondylolisthesis of lumbar region 1/17/2018   • Syncope, recurrent 2/8/2021     Past Surgical History:   Procedure Laterality Date   • A-V CARDIAC PACEMAKER INSERTION  2016   • ATRIAL CARDIAC PACEMAKER INSERTION     • CARDIAC CATHETERIZATION     • CATARACT EXTRACTION     • CERVICAL CORPECTOMY N/A 3/3/2021    Procedure: CERVICAL 6 CORPECTOMY WITH TITANIUM CAGE WITH NEURO MONITORING;  Surgeon: Bandar Shea MD;  Location: [facility] OR;  Service: Neurosurgery;  Laterality: N/A;   • COLONOSCOPY  11/08/2011    One fold in the ascending colon which showed ulcer otherwise normal exam   • COLONOSCOPY  11/12/2004    Normal exam repeat in five years   • CORONARY ANGIOPLASTY WITH STENT PLACEMENT      X 2; 2013 & 2014   • ENDOSCOPY  07/10/2014    Normal exam   • FLAP LEG Right 9/14/2021    Procedure: RIGHT GLUTEAL FASCIOCUTANEOUS ADVANCEMENT FLAP AND RIGHT TENSOR FASCIAL JESSICA FLAP;  Surgeon: Amadeo Turner MD;  Location:  [facility] OR;  Service: Plastics;  Laterality: Right;   • HIP ABDUCTION TENOTOMY BILATERAL Right 1/14/2021    Procedure: RIGHT HIP GLUTEUS MEDLUS / MINIMUS REPAIR, POSSIBLE ACHILLES ALLOGRAFT;  Surgeon: Nino Carlson MD;  Location:  [facility] OR;  Service: Orthopedics;  Laterality: Right;   • " INCISION AND DRAINAGE HIP Right 2/9/2021    Procedure: HIP INCISION AND DRAINAGE;  Surgeon: Nino Carlson MD;  Location:  [facility] OR;  Service: Orthopedics;  Laterality: Right;   • INCISION AND DRAINAGE LEG Right 10/24/2021    Procedure: INCISION AND DRAINAGE LOWER EXTREMITY;  Surgeon: Amadeo Turner MD;  Location:  [facility] OR;  Service: Plastics;  Laterality: Right;   • INCISION AND DRAINAGE OF WOUND Right 7/8/2021    Procedure: INCISION AND DRAINAGE WOUND RIGHT HIP;  Surgeon: James Huntley MD;  Location:  [facility] OR;  Service: Orthopedics;  Laterality: Right;   • JOINT REPLACEMENT     • KYPHOPLASTY WITH BIOPSY Bilateral 10/26/2021    Procedure: THOARCIC 12 KYPHOPLASTY WITH BIOPSY;  Surgeon: Bandar Shea MD;  Location:  [facility] OR;  Service: Neurosurgery;  Laterality: Bilateral;   • LEG DEBRIDEMENT Right 9/14/2021    Procedure: DEBRIDEMENT OF RIGHT HIP WOUND, RIGHT GLUTEAL FASCIOCUTANEOUS ADVANCEMENT FLAP AND RIGHT TENSOR FASCIAL JESSICA FLAP;  Surgeon: Amadeo Turner MD;  Location:  [facility] OR;  Service: Plastics;  Laterality: Right;   • LUMBAR DISCECTOMY Right 3/23/2021    Procedure: LUMBAR DISCECTOMY MICRO, Lumbar 1/2 right;  Surgeon: Bandar Shea MD;  Location:  [facility] OR;  Service: Neurosurgery;  Laterality: Right;   • LUMBAR FUSION N/A 1/19/2018    Procedure: L3-4,L4-5 DECOMPRESSION, POSTERIOR SPINAL FUSION WITH INSTRUMENTATION;  Surgeon: Fortino Oropeza MD;  Location:  [facility] OR;  Service:    • LUMBAR LAMINECTOMY WITH FUSION Left 1/17/2018    Procedure: LEFT L3-4 L4-5 LATERAL LUMBAR INTERBODY FUSION;  Surgeon: Fortino Oropeza MD;  Location:  [facility] OR;  Service:    • MYRINGOTOMY W/ TUBES  09/04/2014    TUBES NO LONGER IN PLACE   • OTHER SURGICAL HISTORY      total knee was infected twice so hardware was removed and spacers were placed   • REPLACEMENT TOTAL KNEE Right      PT Assessment (last 12 hours)     PT Evaluation and Treatment     Row Name 11/08/21 0925          Physical  Therapy Time and Intention    Subjective Information complains of; weakness; fatigue; pain  -     Document Type therapy note (daily note)  -     Mode of Treatment physical therapy  -KINGS     Comment TLSO  -KINGS     Row Name 11/08/21 0925          General Information    Existing Precautions/Restrictions fall; spinal; brace worn when out of bed  TLSO  -KINGS     Row Name 11/08/21 0925          Pain Scale: Numbers Pre/Post-Treatment    Pretreatment Pain Rating 5/10  -KINGS     Posttreatment Pain Rating 7/10  -KINGS     Pain Location back  -KINGS     Pre/Posttreatment Pain Comment pt stated pain was more centralized to her back today, not in LE's  -     Row Name 11/08/21 0925          Bed Mobility    Sidelying-Sit Craig (Bed Mobility) verbal cues; moderate assist (50% patient effort)  -     Sit-Sidelying Craig (Bed Mobility) verbal cues; moderate assist (50% patient effort); 2 person assist  -[facility] Name 11/08/21 0925          Transfers    Comment (Transfers) not attempted due to weakness,  will progress with sliding board t/f  -[facility] Name 11/08/21 0925          Balance    Comment, Balance sitting EOB, pt was able to tolerate for 10 minutes with UE support and CGA  -[facility] Name 11/08/21 0925          Aerobic Exercise    Comment, Aerobic Exercise (Therapeutic Exercise) PROM/AAROM BLE in fowlers.  Pt is able to actively perform ankle pumps BLE, LAQ on LLE, and AAROM for heel slide on BLE  -     Row Name             Wound 10/24/21 0819 Right hip Incision    Wound - Properties Group Placement Date: 10/24/21  -DT Placement Time: 0819  -DT Present on Hospital Admission: N  -DT Side: Right  -DT Location: hip  -DT Primary Wound Type: Incision  -DT     Retired Wound - Properties Group Date first assessed: 10/24/21  -DT Time first assessed: 0819  -DT Present on Hospital Admission: N  -DT Side: Right  -DT Location: hip  -DT Primary Wound Type: Incision  -DT     Row Name             Wound 11/07/21 0416  Bilateral coccyx    Wound - Properties Group Placement Date: 11/07/21  -LF Placement Time: 0416  -LF Present on Hospital Admission: N  -LF Side: Bilateral  -LF Location: coccyx  -LF     Retired Wound - Properties Group Date first assessed: 11/07/21  -LF Time first assessed: 0416  -LF Present on Hospital Admission: N  -LF Side: Bilateral  -LF Location: coccyx  -LF     Row Name 11/08/21 0925          Positioning and Restraints    Pre-Treatment Position in bed  -KINGS     Post Treatment Position bed  -KINGS     In Bed side lying left; call light within reach; encouraged to call for assist; with family/caregiver; side rails up x2  -KINGS           User Key  (r) = Recorded By, (t) = Taken By, (c) = Cosigned By    Initials Name Provider Type    Garcia Jaramillo, SERENA Physical Therapy Assistant    Jean Carlos Aburto, RN Registered Nurse    So Robison RN Registered Nurse              Physical Therapy Education                 Title: PT OT SLP Therapies (In Progress)     Topic: Physical Therapy (In Progress)     Point: Mobility training (Done)     Learning Progress Summary           Patient Acceptance, E, VU,NR by  at 11/4/2021 1509    Comment: Educated pt on spinal precautions, proper body mechanics during bed mobility transfers, POC, and d/c.                   Point: Home exercise program (Not Started)     Learner Progress:  Not documented in this visit.          Point: Body mechanics (Done)     Learning Progress Summary           Patient Acceptance, E, VU,NR by  at 11/4/2021 1509    Comment: Educated pt on spinal precautions, proper body mechanics during bed mobility transfers, POC, and d/c.                   Point: Precautions (Done)     Learning Progress Summary           Patient Acceptance, E,TB, VU by  at 11/7/2021 1027    Comment: positioning    Acceptance, E, VU,NR by  at 11/4/2021 1509    Comment: Educated pt on spinal precautions, proper body mechanics during bed mobility transfers, POC, and d/c.                                User Key     Initials Effective Dates Name Provider Type Discipline     06/16/21 -  Olga Chambers, PTA Physical Therapy Assistant PT     09/07/21 -  Juliana Angela, PT Student PT Student PT              PT Recommendation and Plan     Plan of Care Reviewed With: patient  Progress: improving  Outcome Summary: Pt was premedicated for tx.  Able to perform sidelying to sitting with mod assist using the bed rail and with HOB elevated.  Sitting EOB, pt was able to tolerate for 10 minutes with UE support and CGA.  While sitting EOB, pt could actively perform ankle pumps BLE, LAQ on LLE.  In fowlers performed PROM to BLE for full ROM, pt was able to assist with heel slides and hip add.  Will continue to work with pt to increase strength and sitting tolerance to progress to being able to perform sliding board t/f.   Outcome Measures     Row Name 11/06/21 1300             How much help from another is currently needed...    Putting on and taking off regular lower body clothing? 1  -LS      Bathing (including washing, rinsing, and drying) 2  -LS      Toileting (which includes using toilet bed pan or urinal) 1  -LS      Putting on and taking off regular upper body clothing 2  -LS      Taking care of personal grooming (such as brushing teeth) 3  -LS      Eating meals 3  -LS      AM-PAC 6 Clicks Score (OT) 12  -LS              Functional Assessment    Outcome Measure Options AM-PAC 6 Clicks Daily Activity (OT)  -LS            User Key  (r) = Recorded By, (t) = Taken By, (c) = Cosigned By    Initials Name Provider Type    LS Tara Guerrero COTA Occupational Therapy Assistant                 Time Calculation:    PT Charges     Row Name 11/08/21 0925             Time Calculation    Start Time 0925  -KINGS      Stop Time 0957  -KINGS      Time Calculation (min) 32 min  -KINGS      PT Received On 11/08/21  -KINGS              Time Calculation- PT    Total Timed Code Minutes- PT 32 minute(s)  -KINGS              Timed  Charges    64730 - PT Therapeutic Exercise Minutes 16  -KINGS      26372 - PT Therapeutic Activity Minutes 16  -KINGS              Total Minutes    Timed Charges Total Minutes 32  -KINGS       Total Minutes 32  -KINGS            User Key  (r) = Recorded By, (t) = Taken By, (c) = Cosigned By    Initials Name Provider Type    Garcia Jaramillo PTA Physical Therapy Assistant              Therapy Charges for Today     Code Description Service Date Service Provider Modifiers Qty    37644932333 HC PT THERAPEUTIC ACT EA 15 MIN 11/8/2021 Garcia Francis PTA GP 1    18308052444 HC PT THER PROC EA 15 MIN 11/8/2021 Garcia Francis PTA GP 1          PT G-Codes  Outcome Measure Options: AM-PAC 6 Clicks Daily Activity (OT)  AM-PAC 6 Clicks Score (PT): 10  AM-PAC 6 Clicks Score (OT): 12    Garcia Francis PTA  11/8/2021     n/a

## 2023-05-11 NOTE — PHYSICAL THERAPY INITIAL EVALUATION ADULT - PHYSICAL ASSIST/NONPHYSICAL ASSIST: SIT/STAND, REHAB EVAL
Problem: Safety - Adult  Goal: Free from fall injury  Outcome: Progressing     Problem: ABCDS Injury Assessment  Goal: Absence of physical injury  Outcome: Progressing
Problem: Safety - Adult  Goal: Free from fall injury  Outcome: Progressing     Problem: ABCDS Injury Assessment  Goal: Absence of physical injury  Outcome: Progressing     Problem: Discharge Planning  Goal: Discharge to home or other facility with appropriate resources  Outcome: Progressing     Problem: Pain  Goal: Verbalizes/displays adequate comfort level or baseline comfort level  Outcome: Progressing
Problem: Safety - Adult  Goal: Free from fall injury  Outcome: Progressing     Problem: ABCDS Injury Assessment  Goal: Absence of physical injury  Outcome: Progressing     Problem: Discharge Planning  Goal: Discharge to home or other facility with appropriate resources  Outcome: Progressing     Problem: Pain  Goal: Verbalizes/displays adequate comfort level or baseline comfort level  Outcome: Progressing     Problem: Nutrition Deficit:  Goal: Optimize nutritional status  Outcome: Progressing
verbal cues/1 person assist
verbal cues/1 person assist

## 2023-10-26 NOTE — H&P PST ADULT - ABILITY TO HEAR (WITH HEARING AID OR HEARING APPLIANCE IF NORMALLY USED):
no chest pain, no cough, and no shortness of breath.
Adequate: hears normal conversation without difficulty

## 2023-11-02 NOTE — ED ADULT NURSE NOTE - PAIN RATING/NUMBER SCALE (0-10): ACTIVITY
Returned to patient room to discuss MRCP result. No biliary dilatation or obstruction. Ok for clear liquid diet.      Carmen Riojas, [address] [facility]  Gastroenterology 0

## 2024-12-04 NOTE — H&P PST ADULT - NEGATIVE RESPIRATORY AND THORAX SYMPTOMS
Render In Strict Bullet Format?: No
Detail Level: Detailed
Initiate Treatment: clindamycin phosphate 1 % topical solution Bid\\nQuantity: 60.0 ml  Days Supply: 30\\nSig: Apply to rash on affected areas of the scalp   twice daily.
no wheezing/no dyspnea/no cough

## 2025-06-24 NOTE — PRE-OP CHECKLIST - ALLERGIES REVIEWED
Discharge instructions reviewed with patient. Pt verbalizes understanding. Questions and concerns addressed.  PIV removed with catheter intact.  Patient denies any acute pain or discomfort. Escorted to vehicle via wheelchair.    done